# Patient Record
Sex: MALE | Race: WHITE | Employment: UNEMPLOYED | URBAN - METROPOLITAN AREA
[De-identification: names, ages, dates, MRNs, and addresses within clinical notes are randomized per-mention and may not be internally consistent; named-entity substitution may affect disease eponyms.]

---

## 2018-08-27 ENCOUNTER — HOSPITAL ENCOUNTER (EMERGENCY)
Age: 28
Discharge: LEFT BEFORE DISCHARGE | End: 2018-08-28
Attending: EMERGENCY MEDICINE
Payer: MEDICARE

## 2018-08-27 VITALS
DIASTOLIC BLOOD PRESSURE: 73 MMHG | RESPIRATION RATE: 14 BRPM | SYSTOLIC BLOOD PRESSURE: 125 MMHG | TEMPERATURE: 98.4 F | OXYGEN SATURATION: 100 % | HEART RATE: 102 BPM

## 2018-08-27 DIAGNOSIS — Z76.0 ENCOUNTER FOR MEDICATION REFILL: Primary | ICD-10-CM

## 2018-08-27 PROCEDURE — 99281 EMR DPT VST MAYX REQ PHY/QHP: CPT

## 2018-08-27 RX ORDER — OLANZAPINE 10 MG/1
10 TABLET ORAL ONCE
Status: DISCONTINUED | OUTPATIENT
Start: 2018-08-28 | End: 2018-08-28 | Stop reason: HOSPADM

## 2018-08-28 ASSESSMENT — ENCOUNTER SYMPTOMS
EYES NEGATIVE: 1
COUGH: 1
SHORTNESS OF BREATH: 0
ABDOMINAL PAIN: 0
VOMITING: 0
NAUSEA: 0
WHEEZING: 0

## 2018-08-28 NOTE — ED PROVIDER NOTES
Date of evaluation: 8/27/2018  Chief Complaint   Medication Refill    History of Present Illness   Navneet Oseguera is a 25 y.o. male who presents To the emergency department with his friend at the friend's request.  His friend reports that Jon Lisa did not take his bipolar medication today and he was concerned for him. Jon Lisa states he was recently diagnosed with bipolar and had his last pill yesterday. Endorses he has refills for his olanzapine at the pharmacy but that he has not picked it up yet. Reports he is still \"coming to terms\" with his diagnosis and that he has a lot of family and friend support. Denies any SI/HI/AVH. Currently does not feel he is having a manic high or depressive low. Spoke separately with the friend who agreed he did not think Jon Lisa was a danger to himself or others. He states he was concerned because he wasn't sure what would happen if Jon Lisa missed doses of his medication. Nursing Notes, Past Medical Hx, Past Surgical Hx, Social Hx, Allergies, and Family Hx were reviewed. Review of Systems   Review of Systems   HENT: Negative. Eyes: Negative. Respiratory: Positive for cough (active smoker). Negative for shortness of breath and wheezing. Cardiovascular: Negative for chest pain and palpitations. Gastrointestinal: Negative for abdominal pain, nausea and vomiting. Genitourinary: Negative. Musculoskeletal: Positive for joint swelling (right ankle s/p \"twisting it\" a week or so ago). Psychiatric/Behavioral: Negative for self-injury and suicidal ideas. All other systems reviewed and are negative. Past Medical, Surgical, Family, and Social History   No past medical history on file. No past surgical history on file. His family history includes Cancer in his maternal aunt. He reports that he has never smoked. He does not have any smokeless tobacco history on file. He reports that he drinks alcohol.  He reports that he uses drugs, including

## 2018-08-28 NOTE — ED PROVIDER NOTES
ED Attending Attestation Note     Date of evaluation: 8/27/2018    This patient was seen by the resident. I have seen and examined the patient, agree with the workup, evaluation, management and diagnosis. The care plan has been discussed. My assessment reveals Chris Dinero is awake, walking in the hallway when I saw him, he'll be given a dose of his Zyprexa and discharged home.      Mana López MD  08/28/18 5663

## 2018-08-28 NOTE — ED NOTES
Pt left before getting medication and dc instructions because his ride had to be at work at [facility] had not sent medication by the time patient left the ED     Leota Boxer, RN  08/28/18 0100

## 2022-10-28 ENCOUNTER — APPOINTMENT (OUTPATIENT)
Dept: GENERAL RADIOLOGY | Age: 32
End: 2022-10-28
Payer: MEDICAID

## 2022-10-28 ENCOUNTER — HOSPITAL ENCOUNTER (EMERGENCY)
Age: 32
Discharge: HOME | End: 2022-10-28
Attending: EMERGENCY MEDICINE
Payer: MEDICAID

## 2022-10-28 VITALS
HEART RATE: 101 BPM | OXYGEN SATURATION: 100 % | WEIGHT: 129 LBS | RESPIRATION RATE: 18 BRPM | SYSTOLIC BLOOD PRESSURE: 127 MMHG | DIASTOLIC BLOOD PRESSURE: 98 MMHG | TEMPERATURE: 97.6 F | BODY MASS INDEX: 19.11 KG/M2 | HEIGHT: 69 IN

## 2022-10-28 DIAGNOSIS — S80.01XA CONTUSION OF RIGHT KNEE, INITIAL ENCOUNTER: Primary | ICD-10-CM

## 2022-10-28 PROCEDURE — 73562 X-RAY EXAM OF KNEE 3: CPT

## 2022-10-28 PROCEDURE — 6370000000 HC RX 637 (ALT 250 FOR IP): Performed by: EMERGENCY MEDICINE

## 2022-10-28 PROCEDURE — 99284 EMERGENCY DEPT VISIT MOD MDM: CPT

## 2022-10-28 RX ORDER — NAPROXEN 500 MG/1
500 TABLET ORAL 2 TIMES DAILY WITH MEALS
Qty: 60 TABLET | Refills: 0 | Status: SHIPPED | OUTPATIENT
Start: 2022-10-28

## 2022-10-28 RX ORDER — IBUPROFEN 400 MG/1
800 TABLET ORAL ONCE
Status: COMPLETED | OUTPATIENT
Start: 2022-10-28 | End: 2022-10-28

## 2022-10-28 RX ADMIN — IBUPROFEN 800 MG: 400 TABLET, FILM COATED ORAL at 09:59

## 2022-10-28 ASSESSMENT — PAIN DESCRIPTION - ORIENTATION
ORIENTATION: RIGHT
ORIENTATION: RIGHT

## 2022-10-28 ASSESSMENT — PAIN - FUNCTIONAL ASSESSMENT
PAIN_FUNCTIONAL_ASSESSMENT: 0-10
PAIN_FUNCTIONAL_ASSESSMENT: NONE - DENIES PAIN
PAIN_FUNCTIONAL_ASSESSMENT: 0-10

## 2022-10-28 ASSESSMENT — PAIN DESCRIPTION - LOCATION
LOCATION: KNEE;SHOULDER
LOCATION: KNEE
LOCATION: KNEE

## 2022-10-28 ASSESSMENT — PAIN DESCRIPTION - PAIN TYPE: TYPE: ACUTE PAIN

## 2022-10-28 ASSESSMENT — PAIN DESCRIPTION - DESCRIPTORS
DESCRIPTORS: ACHING
DESCRIPTORS: ACHING

## 2022-10-28 ASSESSMENT — PAIN SCALES - GENERAL
PAINLEVEL_OUTOF10: 9
PAINLEVEL_OUTOF10: 7
PAINLEVEL_OUTOF10: 9

## 2022-10-28 NOTE — ED PROVIDER NOTES
[address] [facility]          ATTENDING PHYSICIAN NOTE       Date of evaluation: 10/28/2022    Chief Complaint     Fall and Knee Pain (R side, fell on Tuesday night )      History of Present Illness     Chavo Prajapati is a 27 y.o. male who presents with right knee pain. The patient states that he fell onto his right knee 3 days ago. He has subsequently had difficulty bearing weight on his right knee. Took 2 Tylenol the other day without much relief. Has not had anything else for pain. It hurts to straighten his knee and bear weight. There is some bruising over his knee. He also reports some discomfort in his left shoulder particularly with abduction although he does not feel that this warrants any x-ray imaging. No loss of consciousness. No other complaints. No other aggravating relieving factors or associated symptoms. Review of Systems     Positive for right knee pain. Negative for fever, vomiting, chest pain, shortness of breath, abdominal pain. All other systems were reviewed and are negative, except as mentioned in HPI. Past Medical, Surgical, Family, and Social History     He has no past medical history on file. He has no past surgical history on file. His family history includes Cancer in his maternal aunt. He reports that he has been smoking cigarettes. He has never used smokeless tobacco. He reports current alcohol use. He reports current drug use. Drug: Marijuana [place]). Medications     Previous Medications    No medications on file       Allergies     He is allergic to bee venom. Physical Exam     INITIAL VITALS: BP: (!) 127/98, Temp: 97.6 °F (36.4 °C), Heart Rate: 99, Resp: 19, SpO2: 95 %       General:  Well appearing. No acute distress. Eyes:  Pupils reactive. No discharge from eyes. ENT:  No discharge from nose. Neck:  Supple. Pulmonary:   Non-labored breathing. Breath sounds clear bilaterally.     Cardiac:  Regular rate and rhythm. No murmurs. Abdomen:  Soft. Non-tender. Non-distended. Musculoskeletal: Ecchymosis over right knee. Diffuse tenderness to palpation over right knee. The patient has full range of motion both passive and active although active range of motion is painful. Right leg is neurovascularly intact. The patient has full range of motion of the left shoulder without overlying bruising or abrasions. Left arm is neurovascularly intact. No focal tenderness to palpation. Skin:  No rash. Neuro:  Alert and oriented x4. Moves all four extremities to command. No focal deficit. Extremities:  Warm and perfused. No peripheral edema. Diagnostic Results     RADIOLOGY:  Please see electronic medical record for imaging studies performed in the ED. RECENT VITALS:  BP: (!) 127/98, Temp: 97.6 °F (36.4 °C), Heart Rate: (!) 101, Resp: 18     Procedures         ED Course     Nursing Notes, Past Medical Hx, Past Surgical Hx, Social Hx, Allergies, and Family Hx were reviewed. The patient was given the following medications:  Orders Placed This Encounter   Medications    ibuprofen (ADVIL;MOTRIN) tablet 800 mg    naproxen (NAPROSYN) 500 MG tablet     Sig: Take 1 tablet by mouth 2 times daily (with meals)     Dispense:  60 tablet     Refill:  0       CONSULTS:  None    MEDICAL DECISION MAKING / ASSESSMENT / PLAN     Ric Small is a 27 y.o. male presenting with right knee pain. He was given ibuprofen for pain. X-ray of the right knee was ordered. I offered x-ray of the left shoulder as well although the patient declined. I think this is reasonable as I have a low suspicion for bony injury. X-ray of right knee was negative for fracture or effusion. The patient was given an Ace wrap for comfort. He was provided with crutches to help with ambulation although advised that he needs to be weightbearing as tolerated and continue to try to bear weight on that knee.   He should keep it elevated and ice aggressively. Prescription for naproxen. Work excuse given to go back to work on Monday. Return precautions given. Clinical Impression     1.  Contusion of right knee, initial encounter        Disposition     PATIENT REFERRED TO:  Rizwana Fischer MD  [address]  [facility] [phone]    Call in 1 week  for follow up if not improved    [facility] Emergency Department  [address] [facility]  [phone]    If symptoms worsen    DISCHARGE MEDICATIONS:  New Prescriptions    NAPROXEN (NAPROSYN) 500 MG TABLET    Take 1 tablet by mouth 2 times daily (with meals)       DISPOSITION Decision To Discharge 10/28/2022 10:42:52 AM           Gustabo Keating MD  10/28/22 4586

## 2022-10-28 NOTE — Clinical Note
Harpal Or was seen and treated in our emergency department on 10/28/2022. He may return to work on 10/31/2022. If you have any questions or concerns, please don't hesitate to call.       Genaro Stock MD

## 2022-10-28 NOTE — DISCHARGE INSTRUCTIONS
Ace wrap for comfort. Elevate your leg when able. Ice for 20 to 30 minutes 2-3 times a day. Use crutches to help with ambulation but continue to bear weight with that knee is much as possible. Naproxen for pain and inflammation.

## 2023-02-24 ENCOUNTER — APPOINTMENT (OUTPATIENT)
Dept: GENERAL RADIOLOGY | Age: 33
End: 2023-02-24
Payer: MEDICAID

## 2023-02-24 ENCOUNTER — APPOINTMENT (OUTPATIENT)
Dept: CT IMAGING | Age: 33
End: 2023-02-24
Payer: MEDICAID

## 2023-02-24 ENCOUNTER — HOSPITAL ENCOUNTER (EMERGENCY)
Age: 33
Discharge: HOME | End: 2023-02-25
Attending: EMERGENCY MEDICINE
Payer: MEDICAID

## 2023-02-24 DIAGNOSIS — S82.142A CLOSED FRACTURE OF LEFT TIBIAL PLATEAU, INITIAL ENCOUNTER: ICD-10-CM

## 2023-02-24 DIAGNOSIS — S62.314A CLOSED DISPLACED FRACTURE OF BASE OF FOURTH METACARPAL BONE OF RIGHT HAND, INITIAL ENCOUNTER: ICD-10-CM

## 2023-02-24 DIAGNOSIS — S62.306A CLOSED DISPLACED FRACTURE OF FIFTH METACARPAL BONE OF RIGHT HAND, UNSPECIFIED PORTION OF METACARPAL, INITIAL ENCOUNTER: Primary | ICD-10-CM

## 2023-02-24 PROCEDURE — 73030 X-RAY EXAM OF SHOULDER: CPT

## 2023-02-24 PROCEDURE — 96372 THER/PROPH/DIAG INJ SC/IM: CPT

## 2023-02-24 PROCEDURE — 6370000000 HC RX 637 (ALT 250 FOR IP): Performed by: STUDENT IN AN ORGANIZED HEALTH CARE EDUCATION/TRAINING PROGRAM

## 2023-02-24 PROCEDURE — 73110 X-RAY EXAM OF WRIST: CPT

## 2023-02-24 PROCEDURE — 73130 X-RAY EXAM OF HAND: CPT

## 2023-02-24 PROCEDURE — 73610 X-RAY EXAM OF ANKLE: CPT

## 2023-02-24 PROCEDURE — 6370000000 HC RX 637 (ALT 250 FOR IP)

## 2023-02-24 PROCEDURE — 6360000002 HC RX W HCPCS

## 2023-02-24 PROCEDURE — 73590 X-RAY EXAM OF LOWER LEG: CPT

## 2023-02-24 PROCEDURE — 73700 CT LOWER EXTREMITY W/O DYE: CPT

## 2023-02-24 PROCEDURE — 99284 EMERGENCY DEPT VISIT MOD MDM: CPT

## 2023-02-24 PROCEDURE — 73552 X-RAY EXAM OF FEMUR 2/>: CPT

## 2023-02-24 PROCEDURE — 29125 APPL SHORT ARM SPLINT STATIC: CPT

## 2023-02-24 RX ORDER — ACETAMINOPHEN 500 MG
1000 TABLET ORAL ONCE
Status: COMPLETED | OUTPATIENT
Start: 2023-02-24 | End: 2023-02-24

## 2023-02-24 RX ORDER — ACETAMINOPHEN 500 MG
1000 TABLET ORAL
Status: COMPLETED | OUTPATIENT
Start: 2023-02-24 | End: 2023-02-24

## 2023-02-24 RX ORDER — IBUPROFEN 400 MG/1
800 TABLET ORAL ONCE
Status: COMPLETED | OUTPATIENT
Start: 2023-02-24 | End: 2023-02-24

## 2023-02-24 RX ORDER — OXYCODONE HYDROCHLORIDE 5 MG/1
5 TABLET ORAL ONCE
Status: COMPLETED | OUTPATIENT
Start: 2023-02-24 | End: 2023-02-24

## 2023-02-24 RX ORDER — KETOROLAC TROMETHAMINE 30 MG/ML
15 INJECTION, SOLUTION INTRAMUSCULAR; INTRAVENOUS ONCE
Status: COMPLETED | OUTPATIENT
Start: 2023-02-24 | End: 2023-02-24

## 2023-02-24 RX ORDER — METHOCARBAMOL 500 MG/1
750 TABLET, FILM COATED ORAL ONCE
Status: COMPLETED | OUTPATIENT
Start: 2023-02-24 | End: 2023-02-24

## 2023-02-24 RX ADMIN — METHOCARBAMOL 750 MG: 500 TABLET ORAL at 21:56

## 2023-02-24 RX ADMIN — IBUPROFEN 800 MG: 400 TABLET ORAL at 21:57

## 2023-02-24 RX ADMIN — OXYCODONE HYDROCHLORIDE 5 MG: 5 TABLET ORAL at 21:56

## 2023-02-24 RX ADMIN — OXYCODONE HYDROCHLORIDE 5 MG: 5 TABLET ORAL at 18:34

## 2023-02-24 RX ADMIN — ACETAMINOPHEN 1000 MG: 500 TABLET ORAL at 21:57

## 2023-02-24 RX ADMIN — KETOROLAC TROMETHAMINE 15 MG: 30 INJECTION, SOLUTION INTRAMUSCULAR; INTRAVENOUS at 15:47

## 2023-02-24 RX ADMIN — ACETAMINOPHEN 1000 MG: 500 TABLET ORAL at 15:47

## 2023-02-24 ASSESSMENT — LIFESTYLE VARIABLES
HOW OFTEN DO YOU HAVE A DRINK CONTAINING ALCOHOL: 2-3 TIMES A WEEK
HOW MANY STANDARD DRINKS CONTAINING ALCOHOL DO YOU HAVE ON A TYPICAL DAY: 1 OR 2

## 2023-02-24 ASSESSMENT — PAIN DESCRIPTION - LOCATION
LOCATION: HAND;KNEE

## 2023-02-24 ASSESSMENT — PAIN SCALES - GENERAL
PAINLEVEL_OUTOF10: 9
PAINLEVEL_OUTOF10: 9
PAINLEVEL_OUTOF10: 8
PAINLEVEL_OUTOF10: 9

## 2023-02-24 ASSESSMENT — PAIN DESCRIPTION - DESCRIPTORS: DESCRIPTORS: SHARP

## 2023-02-24 ASSESSMENT — PAIN - FUNCTIONAL ASSESSMENT: PAIN_FUNCTIONAL_ASSESSMENT: 0-10

## 2023-02-24 NOTE — PLAN OF CARE
[facility] Orthopedic Surgery  Plan of Care Note          Orthopedic Plan of Care Note     Joselito Chen 27 y.o. presented to ED for 3 days of worsening left knee pain and inability WB after fall at home from 3 steps     Imaging reviewed, and showed minimally displaced medial plateau fx to the left knee. But 100% displaced right hand 5th MC fx. Plan:  - Left knee immobilizer, non op treatment at this time  -ulnar gutter right hand. This will require ORIF by one of my partners, on Mon Feb 27th, [facility]     Thank you very much for the kind consultation and allowing me to participate in this patient's care. I will continue to keep you apprised of his progress.            Andrey Boss MD , MD 2/24/2023 5:51 PM

## 2023-02-24 NOTE — ED PROVIDER NOTES
[address] [facility] RESIDENT NOTE       Date of evaluation: 2/24/2023    Chief Complaint     Fall (Pt c/o right hand and left knee pain and swelling post fall. States he fell while taking the garbage out. Pt did not hit head, no LOC.)      History of Present Illness     Raul Chowdhury is a 27 y.o. male with no significant past medical history who presents after a fall on 2/21. Patient states that he was taking his trash out when he fell down approximately 4 stairs, twisting his left knee and landing on his right outstretched hand. He denies hitting his head or losing consciousness. He has not had significant nausea or vomiting since the incident. He denies any midline neck tenderness and has been able to ambulate since the accident. Initially, he attempted to treat his pain with conservative measurements, however today his pain increased to the level where ambulation became difficult and he was unable to complete tasks at work. At that time, his boss instructed him to present to the emergency department. Currently, he is complaining primarily of pain in his left knee as well as his right hand. He also has a mild amount of pain in his right shoulder. He denies any loss of sensation throughout his body. He denies any dizziness or confusion and continues to be without nausea, vomiting or syncope. Patient did not have any abrasions or lacerations from the injury. Patient does not take a blood thinner. MEDICAL DECISION MAKING / ASSESSMENT / PLAN     INITIAL VITALS: BP: 123/72, Temp: 97.7 °F (36.5 °C), Heart Rate: 98, Resp: 16, SpO2: 95 %    Raul Chowdhury is a 27 y.o. male who presents with left knee right hand and right shoulder pain. Plain radiographs of the left lower extremity reveal no obvious osseous abnormality or significant joint effusion.   However, the patient's pain is primarily along the inferior joint line of the knee, raising my concern for an occult tibial plateau fracture. Therefore, an Noncon CT scan was obtained which revealed a small, nondisplaced tibial plateau fracture. Orthopedic surgery was consulted and recommends immobilization in a knee immobilizer and follow-up with them in the outpatient setting on Monday, they are hopeful for nonoperative management but will reassess at that time. He will be nonweightbearing in his left lower extremity. For the pain in the patient's right hand, he was neurovascularly intact in all 5 digits. He attempted to make a fist with mild scissoring of his pinky finger. Plain radiographs revealed acute displaced fractures of the fourth and fifth metacarpal shafts. Orthopedic surgery was consulted on these injuries as well, and feel that the right fourth metacarpal will likely need a pin as it is an angulated fracture, but feels that the fifth metacarpal can be managed nonoperatively. The patient will be placed in a ulnar gutter splint and made nonweightbearing in his right upper extremity. On my secondary survey, the patient was without any other injuries Stearn for acute head injury as the patient is not complaining of a headache, without any loss of consciousness and nausea and vomiting. Ultimately, the patient was offered admission for PT/OT evaluation as well as pain control given that he has nonweightbearing for the side of his body, at this time I am going off service, and his care will be transitioned to Dr. Kyung Plummer, whose tasks will include: Follow up splint placement  Dispo -- Obs for pain control and PT/OT vs home  Pain control    Spoke with Dr. Chacho Diaz who requests the patient presents to [facility] for evaluation for operative management of his right hand and re-eval of the left leg. If they do not call the patient, he should present at [address] [place] [address] at 0900 on 2/27 for evaluation.     Medical Decision Making  Problems Addressed:  Closed displaced fracture of base of fourth metacarpal bone of right hand, initial encounter: acute illness or injury  Closed displaced fracture of fifth metacarpal bone of right hand, unspecified portion of metacarpal, initial encounter: acute illness or injury  Closed fracture of left tibial plateau, initial encounter: acute illness or injury    Amount and/or Complexity of Data Reviewed  Radiology: ordered. Decision-making details documented in ED Course. Risk  OTC drugs. Prescription drug management. Minor surgery with no identified risk factors. This patient was also evaluated by the attending physician. All care plans werediscussed and agreed upon. Clinical Impression     1. Closed displaced fracture of fifth metacarpal bone of right hand, unspecified portion of metacarpal, initial encounter    2. Closed displaced fracture of base of fourth metacarpal bone of right hand, initial encounter    3. Closed fracture of left tibial plateau, initial encounter        Disposition     PATIENT REFERRED TO:  No follow-up provider specified. DISCHARGE MEDICATIONS:  New Prescriptions    No medications on file       DISPOSITION          Diagnostic Results and Other Data     RADIOLOGY:  CT KNEE LEFT WO CONTRAST   Final Result   Impression: Nondisplaced fracture of the medial tibial plateau with involvement of the articular surface. XR WRIST RIGHT (MIN 3 VIEWS)   Final Result   1. Negative radiographs of the left ankle. 2. Acute, displaced negative fractures of the fourth and fifth metatarsal mid shafts in the right hand. No other fractures in the hand or wrist.   3. Negative radiographs of the left shoulder. 4. Negative radiographs of the left tibia/fibula. 5. Negative radiographs of the left femur. XR HAND RIGHT (MIN 3 VIEWS)   Final Result   1. Negative radiographs of the left ankle. 2. Acute, displaced negative fractures of the fourth and fifth metatarsal mid shafts in the right hand.  No other fractures in the hand or wrist.   3. Negative radiographs of the left shoulder. 4. Negative radiographs of the left tibia/fibula. 5. Negative radiographs of the left femur. XR SHOULDER LEFT (MIN 2 VIEWS)   Final Result   1. Negative radiographs of the left ankle. 2. Acute, displaced negative fractures of the fourth and fifth metatarsal mid shafts in the right hand. No other fractures in the hand or wrist.   3. Negative radiographs of the left shoulder. 4. Negative radiographs of the left tibia/fibula. 5. Negative radiographs of the left femur. XR ANKLE LEFT (MIN 3 VIEWS)   Final Result   1. Negative radiographs of the left ankle. 2. Acute, displaced negative fractures of the fourth and fifth metatarsal mid shafts in the right hand. No other fractures in the hand or wrist.   3. Negative radiographs of the left shoulder. 4. Negative radiographs of the left tibia/fibula. 5. Negative radiographs of the left femur. XR TIBIA FIBULA LEFT (2 VIEWS)   Final Result   1. Negative radiographs of the left ankle. 2. Acute, displaced negative fractures of the fourth and fifth metatarsal mid shafts in the right hand. No other fractures in the hand or wrist.   3. Negative radiographs of the left shoulder. 4. Negative radiographs of the left tibia/fibula. 5. Negative radiographs of the left femur. XR FEMUR LEFT (MIN 2 VIEWS)   Final Result   1. Negative radiographs of the left ankle. 2. Acute, displaced negative fractures of the fourth and fifth metatarsal mid shafts in the right hand. No other fractures in the hand or wrist.   3. Negative radiographs of the left shoulder. 4. Negative radiographs of the left tibia/fibula. 5. Negative radiographs of the left femur.                   RECENT VITALS:  BP: 125/88, Temp: 97.7 °F (36.5 °C), Heart Rate: 98,Resp: 16, SpO2: 99 %       ED Course     Nursing Notes, Past Medical Hx, Past Surgical Hx, Social Hx, Allergies, and Family Hx were reviewed. The patient was given the following medications:  Orders Placed This Encounter   Medications    ketorolac (TORADOL) injection 15 mg    acetaminophen (TYLENOL) tablet 1,000 mg    oxyCODONE (ROXICODONE) immediate release tablet 5 mg       CONSULTS:  IP CONSULT TO ORTHOPEDIC SURGERY    Review of Systems   Review of systems otherwise negative. Past Medical, Surgical, Family, and Social History     He has no past medical history on file. He has no past surgical history on file. His family history includes Cancer in his maternal aunt. He reports that he quit smoking 7 days ago. His smoking use included cigarettes. He has never used smokeless tobacco. He reports current alcohol use. He reports current drug use. Drug: Marijuana [place]). Medications     Previous Medications    NAPROXEN (NAPROSYN) 500 MG TABLET    Take 1 tablet by mouth 2 times daily (with meals)       Allergies     He is allergic to bee venom. Physical Exam     INITIAL VITALS: BP: 123/72, Temp: 97.7 °F (36.5 °C), Heart Rate: 98, Resp: 16, SpO2: 95 %   Physical Exam  Vitals reviewed. Constitutional:       General: He is not in acute distress. Appearance: Normal appearance. HENT:      Head: Normocephalic and atraumatic. Eyes:      Extraocular Movements: Extraocular movements intact. Pupils: Pupils are equal, round, and reactive to light. Cardiovascular:      Rate and Rhythm: Normal rate and regular rhythm. Pulses: Normal pulses. Heart sounds: Normal heart sounds. Pulmonary:      Effort: Pulmonary effort is normal.   Abdominal:      General: Abdomen is flat. Musculoskeletal:      Right hand: Swelling, deformity and tenderness present. Normal range of motion. Cervical back: Normal range of motion. No rigidity. Right knee: Swelling, deformity and ecchymosis present. Tenderness present. No ACL laxity or PCL laxity. Left knee: Ecchymosis present. No swelling or effusion.  No ACL laxity or PCL laxity. Skin:     General: Skin is warm and dry. Neurological:      General: No focal deficit present. Mental Status: He is alert. Sensory: No sensory deficit. Motor: No weakness.               Latasha Gastelum MD  Resident  02/24/23 4149

## 2023-02-24 NOTE — ED PROVIDER NOTES
ED Attending Attestation Note     Date of evaluation: 2/24/2023    This patient was seen by the resident. I have seen and examined the patient, agree with the workup, evaluation, management and diagnosis. The care plan has been discussed. My assessment reveals a very slender, generally well-appearing gentleman, in obvious discomfort, but in no acute distress. The patient presents to the emergency department with ongoing pain and swelling of primarily his right hand and left knee after a fall down several stairs 3 days ago. On examination, the patient has obvious diffuse edema of the right hand, with pain primarily over the metacarpals, and some ecchymosis over the palmar surface of the hand. Plain films do show fractures of the fourth and fifth metacarpals. Additionally, the patient has an obvious significant left knee effusion, with significant tenderness to palpation and pain on range of motion, and pain that is much worse with weightbearing. On weightbearing and palpation, his pain appears to be most prominent over the proximal tibia. Plain films do not show any obvious bony abnormality, but given the severity of the effusion and the location of his maximal tenderness, a CT is being performed of the knee to evaluate for an occult tibial plateau fracture.        Josiane Cabrera MD  02/24/23 0819

## 2023-02-25 VITALS
DIASTOLIC BLOOD PRESSURE: 86 MMHG | HEART RATE: 73 BPM | HEIGHT: 69 IN | WEIGHT: 123.1 LBS | OXYGEN SATURATION: 99 % | SYSTOLIC BLOOD PRESSURE: 120 MMHG | RESPIRATION RATE: 18 BRPM | TEMPERATURE: 98.5 F | BODY MASS INDEX: 18.23 KG/M2

## 2023-02-25 PROCEDURE — 97530 THERAPEUTIC ACTIVITIES: CPT

## 2023-02-25 PROCEDURE — 97166 OT EVAL MOD COMPLEX 45 MIN: CPT

## 2023-02-25 PROCEDURE — 97162 PT EVAL MOD COMPLEX 30 MIN: CPT

## 2023-02-25 PROCEDURE — 97116 GAIT TRAINING THERAPY: CPT

## 2023-02-25 RX ORDER — OXYCODONE HYDROCHLORIDE 5 MG/1
5 TABLET ORAL EVERY 6 HOURS PRN
Qty: 14 TABLET | Refills: 0 | Status: SHIPPED | OUTPATIENT
Start: 2023-02-25 | End: 2023-02-26

## 2023-02-25 ASSESSMENT — PAIN - FUNCTIONAL ASSESSMENT
PAIN_FUNCTIONAL_ASSESSMENT: 0-10
PAIN_FUNCTIONAL_ASSESSMENT: PREVENTS OR INTERFERES SOME ACTIVE ACTIVITIES AND ADLS

## 2023-02-25 ASSESSMENT — PAIN DESCRIPTION - ONSET: ONSET: GRADUAL

## 2023-02-25 ASSESSMENT — PAIN DESCRIPTION - LOCATION: LOCATION: HAND

## 2023-02-25 ASSESSMENT — PAIN DESCRIPTION - PAIN TYPE: TYPE: ACUTE PAIN

## 2023-02-25 ASSESSMENT — PAIN SCALES - GENERAL: PAINLEVEL_OUTOF10: 4

## 2023-02-25 ASSESSMENT — PAIN DESCRIPTION - DESCRIPTORS: DESCRIPTORS: DISCOMFORT

## 2023-02-25 ASSESSMENT — PAIN DESCRIPTION - ORIENTATION: ORIENTATION: RIGHT

## 2023-02-25 ASSESSMENT — PAIN DESCRIPTION - FREQUENCY: FREQUENCY: CONTINUOUS

## 2023-02-25 NOTE — PROGRESS NOTES
Occupational Therapy  Facility/Department: [address] [place] [address]  Occupational Therapy Initial Assessment, Tx, and Discharge    Name: Ric Small  : 1990  MRN: [id number]  Date of Service: 2023    Discharge Recommendations: Ric Small scored a 21/24 on the AM-[facility] ADL Inpatient form. At this time, no further OT is recommended upon discharge. Recommend patient returns to prior setting with prior services. 24 hour supervision or assist  OT Equipment Recommendations  Other: Pt has needed DME       Patient Diagnosis(es): The primary encounter diagnosis was Closed displaced fracture of fifth metacarpal bone of right hand, unspecified portion of metacarpal, initial encounter. Diagnoses of Closed displaced fracture of base of fourth metacarpal bone of right hand, initial encounter and Closed fracture of left tibial plateau, initial encounter were also pertinent to this visit. Past Medical History:  has no past medical history on file. Past Surgical History:  has no past surgical history on file. Assessment   Performance deficits / Impairments: Decreased functional mobility ; Decreased ADL status; Decreased high-level IADLs;Decreased ROM  Assessment: Pt is 27 y.o male who presents from home after a fall resulting in L tibial plateau fx and R 4NG/0AS MC fx. Pt reports PTA he was highly independent with all functional activity. Pt reports he has been utilizing wc for mobility at home and to complete ADL/IADL activity. Pt demonstrates functioning close to baseline and completes functional mobility, functional transfers and ADLs mod I. Pt reports no concerns for returning home. No acute OT needs identified, will dc services. Please re-consult should a need arise. Recommend initial 24 hr supervision for safety.    Prognosis: Good  Decision Making: Medium Complexity  REQUIRES OT FOLLOW-UP: No  Activity Tolerance  Activity Tolerance: Patient Tolerated treatment well Plan   Occupational Therapy Plan  Times Per Week: 1x visit only     Restrictions  Position Activity Restriction  Other position/activity restrictions: Knee immobilizer, NWB LLE, NWB R hand    Subjective   General  Chart Reviewed: Yes  Patient assessed for rehabilitation services?: Yes  Additional Pertinent Hx: Admit 2/24 after a fall. Xray femur (negative); Xray L tibia/fibula (negative); Xray L ankle (negative); Xray R hand - Acute, displaced negative fractures of the fourth and fifth metatarsal mid shafts in the right hand; Xray L shoulder - negative; CT knee - nondisplaced fx of medial tibial plateau with involvement of articular surface  Family / Caregiver Present: No  Referring Practitioner: Tiffany Hanna MD  Diagnosis: Fall  Subjective  Subjective: Pt found supine on stretcher. Pt agreeable to OT eval and tx. Pt does not rate pain in RUE/LLE, RN aware  Social/Functional History  Social/Functional History  Lives With:  (brother)  Type of Home: Apartment (2nd floor)  Home Layout: One level  Home Access: Stairs to enter with rails (flight of steps to 2nd floor apartment)  Bathroom Shower/Tub: Tub/Shower unit  Bathroom Toilet: Standard  Bathroom Equipment: None  Bathroom Accessibility: Wheelchair accessible  Home Equipment: Mcgraw Ora  Has the patient had two or more falls in the past year or any fall with injury in the past year?: No  ADL Assistance: Independent  Homemaking Assistance: Independent  Ambulation Assistance: Independent  Transfer Assistance: Independent  Active : No  Mode of Transportation: Walk  Occupation: Full time employment (works at [employer])       Objective Treatment included functional transfer training, ADL's and pt. education. Safety Devices  Type of Devices: Left in bed;Nurse notified;Call light within reach (on stretcher in ED  Simultaneous filing.  User may not have seen previous data.)  Bed Mobility Training  Bed Mobility Training: Yes  Overall Level of Assistance: Modified independent  Supine to Sit: Modified independent  Sit to Supine: Modified independent  Balance  Sitting: Intact  Standing: With support  Transfer Training  Transfer Training: Yes  Overall Level of Assistance: Modified independent  Interventions: Verbal cues; Safety awareness training  Sit to Stand: Modified independent (stand pivot to chair and stand up to platform walker)  Stand to Sit: Modified independent  Stand Pivot Transfers: Modified independent  Bed to Chair: Modified independent (simulated WC transfer)  Gait  Overall Level of Assistance: Modified independent (with platform walker)  Interventions: Verbal cues; Safety awareness training     AROM: Within functional limits (R Hand/wrist not assessed 2/2 ulnar gutter in place, remainder of B UE WFL)  PROM: Within functional limits  Strength: Within functional limits  Coordination: Within functional limits  Tone: Normal  Sensation: Intact  ADL  LE Dressing: Modified independent   Toileting: Modified independent               Vision  Vision: Within Functional Limits  Hearing  Hearing: Within functional limits  Cognition  Overall Cognitive Status: WNL  Orientation  Overall Orientation Status: Within Normal Limits (Simultaneous filing. User may not have seen previous data.)                  Education Given To: Patient  Education Provided: Role of Therapy; ADL Adaptive Strategies; Fall Prevention Strategies; Plan of Care;Transfer Training  Education Method: Verbal  Barriers to Learning: None  Education Outcome: Verbalized understanding                      AM-PAC Score  AM-PAC Inpatient Daily Activity Raw Score: 21 (02/25/23 0847)  AM-PAC Inpatient ADL T-Scale Score : 44.27 (02/25/23 0847)  ADL Inpatient CMS 0-100% Score: 32.79 (02/25/23 0847)  ADL Inpatient CMS G-Code Modifier : CJ (02/25/23 9337)      Therapy Time   Individual Concurrent Group Co-treatment   Time In 0805         Time Out 0830         Minutes 25             Timed Code Treatment Minutes:   15 mins + 10 min eval    Total Treatment Minutes:  25 mins      Jorden Moss, OTR/L

## 2023-02-25 NOTE — ED PROVIDER NOTES
[facility] Emergency Department  ED Observation Disposition Note   Emergency Physicians         Diagnostic Evaluation      Diagnostic studies germane to this period of clinical observation include:    PT OT eval     Consultant(s) Final Recommendations      - Home with family help, follow up with ortho     Impression and Plan      Gerardo White has been cared for according to the standard [facility] observation protocol for PT-OT eval. This extended period of observation was specifically required to determine the need for hospitalization. Prior to discharge from observation, the final physical exam is documented below. Significant events during the course of observation based on the goals of the clinical problem list include:      -Pain controlled overnight, PT OT evaluation this morning found to have sufficient resources at home    Based on the patient's condition, clinical response, and diagnostic information obtained during this period of observation, the plan is to Discharge to home    The total length of observation was 12 hours. Dr. Sims Dubin is the observation disposition attending. The patient will follow-up with:    - Dr Cuate Tinajero    As appropriate, please see the AVS for comprehensive discharge instructions. Deana White has undergone comprehensive diagnostic evaluation and therapeutic management in accordance with the CDU guidelines for PT OT eval.  At the time of disposition, the patient was evaluated by PTOT, appears to have appropriate resources at home, able to follow-up, and appropriate for discharge. We will prescribe some oxycodone for use in addition to Tylenol and ibuprofen, and provided orthopedic follow-up information. Placido Gonzalez Physical Examination      Physical Exam  Constitutional:       Appearance: Normal appearance. Pulmonary:      Effort: Pulmonary effort is normal. No respiratory distress.    Musculoskeletal:      Cervical back: Normal range of motion. Comments: Left knee in extension brace   Neurological:      General: No focal deficit present. Mental Status: He is alert and oriented to person, place, and time.            Nkechi Kunz MD  02/25/23 6937

## 2023-02-25 NOTE — ED PROVIDER NOTES
[facility] RESIDENT NOTE     Date of evaluation: 2/24/2023    ADDENDUM:      Care of this patient was assumed from Dr. Leif Jaime. The patient was seen for Fall (Pt c/o right hand and left knee pain and swelling post fall. States he fell while taking the garbage out. Pt did not hit head, no LOC.)  . The patient's initial evaluation and plan have been discussed with the prior provider who initially evaluated the patient. Nursing Notes, Past Medical Hx, Past Surgical Hx, Social Hx, Allergies, and Family Hx were all reviewed. Diagnostic Results     EKG   None. RADIOLOGY:  CT KNEE LEFT WO CONTRAST   Final Result   Impression: Nondisplaced fracture of the medial tibial plateau with involvement of the articular surface. XR WRIST RIGHT (MIN 3 VIEWS)   Final Result   1. Negative radiographs of the left ankle. 2. Acute, displaced negative fractures of the fourth and fifth metatarsal mid shafts in the right hand. No other fractures in the hand or wrist.   3. Negative radiographs of the left shoulder. 4. Negative radiographs of the left tibia/fibula. 5. Negative radiographs of the left femur. XR HAND RIGHT (MIN 3 VIEWS)   Final Result   1. Negative radiographs of the left ankle. 2. Acute, displaced negative fractures of the fourth and fifth metatarsal mid shafts in the right hand. No other fractures in the hand or wrist.   3. Negative radiographs of the left shoulder. 4. Negative radiographs of the left tibia/fibula. 5. Negative radiographs of the left femur. XR SHOULDER LEFT (MIN 2 VIEWS)   Final Result   1. Negative radiographs of the left ankle. 2. Acute, displaced negative fractures of the fourth and fifth metatarsal mid shafts in the right hand. No other fractures in the hand or wrist.   3. Negative radiographs of the left shoulder. 4. Negative radiographs of the left tibia/fibula.    5. Negative radiographs of the left femur.            XR ANKLE LEFT (MIN 3 VIEWS)   Final Result   1. Negative radiographs of the left ankle. 2. Acute, displaced negative fractures of the fourth and fifth metatarsal mid shafts in the right hand. No other fractures in the hand or wrist.   3. Negative radiographs of the left shoulder. 4. Negative radiographs of the left tibia/fibula. 5. Negative radiographs of the left femur. XR TIBIA FIBULA LEFT (2 VIEWS)   Final Result   1. Negative radiographs of the left ankle. 2. Acute, displaced negative fractures of the fourth and fifth metatarsal mid shafts in the right hand. No other fractures in the hand or wrist.   3. Negative radiographs of the left shoulder. 4. Negative radiographs of the left tibia/fibula. 5. Negative radiographs of the left femur. XR FEMUR LEFT (MIN 2 VIEWS)   Final Result   1. Negative radiographs of the left ankle. 2. Acute, displaced negative fractures of the fourth and fifth metatarsal mid shafts in the right hand. No other fractures in the hand or wrist.   3. Negative radiographs of the left shoulder. 4. Negative radiographs of the left tibia/fibula. 5. Negative radiographs of the left femur. LABS:   No results found for this visit on 23.     RECENT VITALS:  BP: 125/88, Temp: 97.7 °F (36.5 °C), Heart Rate: 98, Resp: 16, SpO2: 99 %     Procedures     None    ED Course            The patient was given the following medications:  Orders Placed This Encounter   Medications    ketorolac (TORADOL) injection 15 mg    acetaminophen (TYLENOL) tablet 1,000 mg    oxyCODONE (ROXICODONE) immediate release tablet 5 mg    oxyCODONE (ROXICODONE) immediate release tablet 5 mg    acetaminophen (TYLENOL) tablet 1,000 mg    ibuprofen (ADVIL;MOTRIN) tablet 800 mg    methocarbamol (ROBAXIN) tablet 750 mg       CONSULTS:  CHERRI Ignacio / ADEN / Harpal [place] is a 27 y.o. male who presents after a fall on Tuesday. Has a left tibial plateau fx  - ortho recs knee immobilizer, F/U Monday, NWB LLE. Additionally has a R 4th and 5th Metacarpal fx - angulated, will likely require surgery, potentially Monday. Splint placement and pain control pending at time of signout. Patient deciding on ED obs for PT/OT vs D/C. Patient is reassessed after splint placement. Continues to complain of pain for which oxycodone was ordered. Discussion is had at length regarding disposition, patient will opt for ED observation for PT OT in the morning and continuing pain control. Multimodal analgesia is ordered for overnight. PT OT consultation is placed. Patient is placed in ED observation. This patient was also evaluated by the attending physician. All care plans were discussed and agreed upon. Clinical Impression     1. Closed displaced fracture of fifth metacarpal bone of right hand, unspecified portion of metacarpal, initial encounter    2. Closed displaced fracture of base of fourth metacarpal bone of right hand, initial encounter    3. Closed fracture of left tibial plateau, initial encounter        Disposition     PATIENT REFERRED TO:  No follow-up provider specified.     DISCHARGE MEDICATIONS:  New Prescriptions    No medications on file       DISPOSITION Ed Observation 02/24/2023 09:20:57 PM         Maida Hilton MD  Resident  02/25/23 5309

## 2023-02-25 NOTE — PROGRESS NOTES
Physical Therapy  Facility/Department: [facility] EMERGENCY DEPARTMENT  Physical Therapy Initial Assessment and DISCHARGE    Name: Veronica Decker  : 1990  MRN: [id number]  Date of Service: 2023    Discharge Recommendations:  Veronica Decker scored a 21/24 on the AM-PAC short mobility form. At this time, no further PT is recommended upon discharge. Recommend patient returns to prior setting with prior services. PT Equipment Recommendations  Equipment Needed: No      Assessment   Assessment: Pt from home s/p fall, resulting in L tibial plateau fx and R 5th metacarpal fx. Pt demonstrate independent stand pivot transfer techniques. Pt trialed use of platform walker with SBA, but prefers to return home at w/c level. Pt has a w/c at home. Pt plans to negotiate stairs on his buttocks and has a friend available to assist.  Pt has already been using this technique on the steps since his fall. No further PT or DME needs identified. Galindo sign off from acute PT services. Decision Making: Medium Complexity  Requires PT Follow-Up: No     Plan   General Plan: Discharge with evaluation only    Safety Devices  Type of Devices: Left in bed, Nurse notified, Call light within reach (on stretcher in ED  Simultaneous filing. User may not have seen previous data.)     Restrictions  Knee immobilizer, NWB LLE, NWB R hand     Subjective   Pain: Pt does not rate pain in RUE/LLE, RN aware    General  Chart Reviewed: Yes  Additional Pertinent Hx: Pt to ED  with L knee pain s/p fall down steps 3 days prior. [place] (+) for minimally displaced medial plateau fx to the left knee and 100% displaced right hand 5th MC fx. Ortho consult:  Non-op management L knee with immobilizer, plan for R hand ORIF on  at [facility].  PMH:  None  Diagnosis: Multiple fxs    Subjective  Subjective: Pt found supine in bed. Pt agreeable for PT. \"I've kind of just been figuring it out. \"    Social/Functional History  Lives With:  (brother)  Type of Home: Apartment (2nd floor)  Home Layout: One level  Home Access: Stairs to enter with rails (flight of steps to 2nd floor apartment)  Bathroom Shower/Tub: Tub/Shower unit  Bathroom Toilet: Standard  Bathroom Equipment: None  Bathroom Accessibility: Wheelchair accessible  Home Equipment: Edilberto Monaco  Has the patient had two or more falls in the past year or any fall with injury in the past year?: No  ADL Assistance: Independent  Homemaking Assistance: Independent  Ambulation Assistance: Independent  Transfer Assistance: Independent  Active : No  Mode of Transportation: Walk  Occupation: Full time employment (works at [employer])    Vision/Hearing  Vision: Within Functional Limits  Hearing: Within functional limits        Objective   Gross Assessment  AROM: Within functional limits  Strength: Within functional limits (L knee in immobilizer)     Bed mobility  Supine to Sit: Independent  Sit to Supine: Independent    Transfers  Sit to Stand: Modified independent  Stand to Sit: Modified independent  Stand Pivot Transfers: Modified independent    Ambulation  Device: Platform Walker right  Other Apparatus: Left;Knee Immobilizer  Assistance: Stand by assistance  Quality of Gait: hopping on R LE, steady gait  Distance: 15ft    Stairs?: No (Pt plans to scoot up steps on behind and has assist from friend available.   Pt has already been using this technique since fall.)    Balance  Sitting - Static: Good  Sitting - Dynamic: Good  Standing - Static: Good  Standing - Dynamic: Good      AM-PAC Score  AM-PAC Inpatient Mobility Raw Score : 21 (02/25/23 0837)  AM-PAC Inpatient T-Scale Score : 50.25 (02/25/23 0837)  Mobility Inpatient CMS 0-100% Score: 28.97 (02/25/23 0837)  Mobility Inpatient CMS G-Code Modifier : Natalia Rodriguez (02/25/23 8865)    Education  Patient Education  Education Given To: Patient  Education Provided: Role of Therapy;Precautions;Transfer Training  Education Provided Comments: knee immobilizer and WB restrictions  Education Method: Verbal  Education Outcome: Verbalized understanding;Demonstrated understanding      Therapy Time   Individual Concurrent Group Co-treatment   Time In 0805         Time Out 0830         Minutes 25         Timed Code Treatment Minutes:  15  Total Treatment Minutes:  [address], PT

## 2023-02-25 NOTE — ED PROVIDER NOTES
[facility] Emergency Department  EDObservation Admission Note   Emergency Physicians       Time Placed in ED Observation: DISPOSITION Ed Observation 2023 09:20:57 PM    Impression and Plan      In summary, Emely Holley is admitted by to the [facility] Observation Unit for PT/OT evaluation and pain control. Dr. Aliya Doherty is the observation admission attending. This patient has been risk-stratified based on the available history, physical exam, and related study findings. Admission to observation status for further diagnosis/treatment/monitoring of tibial plateau and hand fractures is warranted clinically. This extended period of observation is specifically required to determine the need for hospitalization. We will observe the patient for the following endpoints:    - PT/OT clearance  - Clinical stability of pain on oral medications    When met, appropriate disposition will be arranged. Diagnostic Evaluation      Diagnostic studies and ED interventions germane to this period of clinical observation will include:    PT/OT consultation       Consultant(s)      IP CONSULT TO ORTHOPEDIC SURGERY       Patient History      Emely Holley is a 27 y.o. male who presented to the Emergency Department for evaluation of knee pain. The acute evaluation included CT evidence of tibial plateau fx and XR evidence of metacarpal fractures. Upon admission to the Observation Unit, Emely Holley was awake, alert, conversant. He had pain in the Left knee. Past Medical History  History reviewed. No pertinent past medical history. History reviewed. No pertinent surgical history. Family History   Problem Relation Age of Onset    Cancer Maternal Aunt          of skin cancer     Social History     Tobacco Use    Smoking status: Former     Types: Cigarettes     Quit date: 2023     Years since quittin.0    Smokeless tobacco: Never   Substance Use Topics    Alcohol use:  Yes Comment: social    Drug use: Yes     Types: Marijuana (Weed)        Medications      Previous Medications    NAPROXEN (NAPROSYN) 500 MG TABLET    Take 1 tablet by mouth 2 times daily (with meals)          Review of Systems      No vomiting     Physical Examination      Physical Exam  Awake, alert, conversant  Right hand: moves all fingers, cap refill intact, sensation intact  Left lower extremity: compartments soft, cap refill intact, sensation intact, moves all digits on foot and at ankle plantar/dorsiflexion     Hilary Bermudez MD  02/24/23 4793

## 2023-02-25 NOTE — DISCHARGE INSTRUCTIONS
Return to ED for worsening symptoms or other concerns. Follow-up with orthopedics on Monday. You can start with 650-1000 mg Tylenol and ibuprofen for pain, and if pain is severe, you can take one of the oxycodone but would recommend the oxycodone taking sparingly, only when needed.

## 2023-02-27 ENCOUNTER — TELEPHONE (OUTPATIENT)
Dept: ORTHOPEDIC SURGERY | Age: 33
End: 2023-02-27

## 2023-02-27 ENCOUNTER — ANESTHESIA EVENT (OUTPATIENT)
Dept: OPERATING ROOM | Age: 33
End: 2023-02-27
Payer: MEDICAID

## 2023-02-27 ENCOUNTER — HOSPITAL ENCOUNTER (OUTPATIENT)
Age: 33
Setting detail: OUTPATIENT SURGERY
Discharge: HOME | End: 2023-02-27
Attending: ORTHOPAEDIC SURGERY | Admitting: ORTHOPAEDIC SURGERY
Payer: MEDICAID

## 2023-02-27 ENCOUNTER — ANESTHESIA (OUTPATIENT)
Dept: OPERATING ROOM | Age: 33
End: 2023-02-27
Payer: MEDICAID

## 2023-02-27 ENCOUNTER — APPOINTMENT (OUTPATIENT)
Dept: GENERAL RADIOLOGY | Age: 33
End: 2023-02-27
Attending: ORTHOPAEDIC SURGERY
Payer: MEDICAID

## 2023-02-27 VITALS
DIASTOLIC BLOOD PRESSURE: 74 MMHG | TEMPERATURE: 97 F | HEIGHT: 69 IN | OXYGEN SATURATION: 99 % | RESPIRATION RATE: 18 BRPM | HEART RATE: 84 BPM | WEIGHT: 125 LBS | SYSTOLIC BLOOD PRESSURE: 119 MMHG | BODY MASS INDEX: 18.51 KG/M2

## 2023-02-27 DIAGNOSIS — S62.326A CLOSED DISPLACED FRACTURE OF SHAFT OF FIFTH METACARPAL BONE OF RIGHT HAND, INITIAL ENCOUNTER: Primary | ICD-10-CM

## 2023-02-27 PROBLEM — S82.142A CLOSED FRACTURE OF LEFT TIBIAL PLATEAU: Status: ACTIVE | Noted: 2023-02-27

## 2023-02-27 PROBLEM — S62.314A CLOSED DISPLACED FRACTURE OF BASE OF FOURTH METACARPAL BONE OF RIGHT HAND: Status: ACTIVE | Noted: 2023-02-27

## 2023-02-27 PROCEDURE — 3209999900 FLUORO FOR SURGICAL PROCEDURES

## 2023-02-27 PROCEDURE — 7100000000 HC PACU RECOVERY - FIRST 15 MIN: Performed by: ORTHOPAEDIC SURGERY

## 2023-02-27 PROCEDURE — 7100000011 HC PHASE II RECOVERY - ADDTL 15 MIN: Performed by: ORTHOPAEDIC SURGERY

## 2023-02-27 PROCEDURE — 6360000002 HC RX W HCPCS: Performed by: ANESTHESIOLOGY

## 2023-02-27 PROCEDURE — 6360000002 HC RX W HCPCS: Performed by: ORTHOPAEDIC SURGERY

## 2023-02-27 PROCEDURE — 6360000002 HC RX W HCPCS

## 2023-02-27 PROCEDURE — 2580000003 HC RX 258: Performed by: ORTHOPAEDIC SURGERY

## 2023-02-27 PROCEDURE — 2500000003 HC RX 250 WO HCPCS

## 2023-02-27 PROCEDURE — 27530 TREAT KNEE FRACTURE: CPT | Performed by: ORTHOPAEDIC SURGERY

## 2023-02-27 PROCEDURE — 2709999900 HC NON-CHARGEABLE SUPPLY: Performed by: ORTHOPAEDIC SURGERY

## 2023-02-27 PROCEDURE — 3600000014 HC SURGERY LEVEL 4 ADDTL 15MIN: Performed by: ORTHOPAEDIC SURGERY

## 2023-02-27 PROCEDURE — 3700000000 HC ANESTHESIA ATTENDED CARE: Performed by: ORTHOPAEDIC SURGERY

## 2023-02-27 PROCEDURE — C1713 ANCHOR/SCREW BN/BN,TIS/BN: HCPCS | Performed by: ORTHOPAEDIC SURGERY

## 2023-02-27 PROCEDURE — 3600000004 HC SURGERY LEVEL 4 BASE: Performed by: ORTHOPAEDIC SURGERY

## 2023-02-27 PROCEDURE — 7100000010 HC PHASE II RECOVERY - FIRST 15 MIN: Performed by: ORTHOPAEDIC SURGERY

## 2023-02-27 PROCEDURE — 6370000000 HC RX 637 (ALT 250 FOR IP): Performed by: ANESTHESIOLOGY

## 2023-02-27 PROCEDURE — 99222 1ST HOSP IP/OBS MODERATE 55: CPT | Performed by: ORTHOPAEDIC SURGERY

## 2023-02-27 PROCEDURE — A4217 STERILE WATER/SALINE, 500 ML: HCPCS | Performed by: ORTHOPAEDIC SURGERY

## 2023-02-27 PROCEDURE — 7100000001 HC PACU RECOVERY - ADDTL 15 MIN: Performed by: ORTHOPAEDIC SURGERY

## 2023-02-27 PROCEDURE — 73120 X-RAY EXAM OF HAND: CPT

## 2023-02-27 PROCEDURE — 3700000001 HC ADD 15 MINUTES (ANESTHESIA): Performed by: ORTHOPAEDIC SURGERY

## 2023-02-27 PROCEDURE — 26615 TREAT METACARPAL FRACTURE: CPT | Performed by: ORTHOPAEDIC SURGERY

## 2023-02-27 PROCEDURE — 2720000010 HC SURG SUPPLY STERILE: Performed by: ORTHOPAEDIC SURGERY

## 2023-02-27 PROCEDURE — 2580000003 HC RX 258

## 2023-02-27 PROCEDURE — 2500000003 HC RX 250 WO HCPCS: Performed by: ORTHOPAEDIC SURGERY

## 2023-02-27 DEVICE — LOCKING SCREW, T6
Type: IMPLANTABLE DEVICE | Site: FINGERS | Status: FUNCTIONAL
Brand: VARIAX

## 2023-02-27 DEVICE — BONE SCREW, T6
Type: IMPLANTABLE DEVICE | Site: FINGERS | Status: FUNCTIONAL
Brand: VARIAX

## 2023-02-27 DEVICE — 2.3 M VARIAX HAND LOCK T-PLATE, NARROW
Type: IMPLANTABLE DEVICE | Site: FINGERS | Status: FUNCTIONAL
Brand: VARIAX

## 2023-02-27 DEVICE — BONE SCREW, T5
Type: IMPLANTABLE DEVICE | Site: FINGERS | Status: FUNCTIONAL
Brand: VARIAX

## 2023-02-27 RX ORDER — SODIUM CHLORIDE 0.9 % (FLUSH) 0.9 %
5-40 SYRINGE (ML) INJECTION EVERY 12 HOURS SCHEDULED
Status: DISCONTINUED | OUTPATIENT
Start: 2023-02-27 | End: 2023-02-27 | Stop reason: HOSPADM

## 2023-02-27 RX ORDER — ONDANSETRON 2 MG/ML
INJECTION INTRAMUSCULAR; INTRAVENOUS PRN
Status: DISCONTINUED | OUTPATIENT
Start: 2023-02-27 | End: 2023-02-27 | Stop reason: SDUPTHER

## 2023-02-27 RX ORDER — MIDAZOLAM HYDROCHLORIDE 1 MG/ML
INJECTION INTRAMUSCULAR; INTRAVENOUS PRN
Status: DISCONTINUED | OUTPATIENT
Start: 2023-02-27 | End: 2023-02-27 | Stop reason: SDUPTHER

## 2023-02-27 RX ORDER — HYDROCODONE BITARTRATE AND ACETAMINOPHEN 5; 325 MG/1; MG/1
1 TABLET ORAL
Status: COMPLETED | OUTPATIENT
Start: 2023-02-27 | End: 2023-02-27

## 2023-02-27 RX ORDER — CEFAZOLIN SODIUM 1 G/3ML
INJECTION, POWDER, FOR SOLUTION INTRAMUSCULAR; INTRAVENOUS PRN
Status: DISCONTINUED | OUTPATIENT
Start: 2023-02-27 | End: 2023-02-27 | Stop reason: SDUPTHER

## 2023-02-27 RX ORDER — DEXAMETHASONE SODIUM PHOSPHATE 4 MG/ML
INJECTION, SOLUTION INTRA-ARTICULAR; INTRALESIONAL; INTRAMUSCULAR; INTRAVENOUS; SOFT TISSUE PRN
Status: DISCONTINUED | OUTPATIENT
Start: 2023-02-27 | End: 2023-02-27 | Stop reason: SDUPTHER

## 2023-02-27 RX ORDER — LIDOCAINE HYDROCHLORIDE 20 MG/ML
INJECTION, SOLUTION EPIDURAL; INFILTRATION; INTRACAUDAL; PERINEURAL PRN
Status: DISCONTINUED | OUTPATIENT
Start: 2023-02-27 | End: 2023-02-27 | Stop reason: SDUPTHER

## 2023-02-27 RX ORDER — ONDANSETRON 2 MG/ML
4 INJECTION INTRAMUSCULAR; INTRAVENOUS
Status: DISCONTINUED | OUTPATIENT
Start: 2023-02-27 | End: 2023-02-27 | Stop reason: HOSPADM

## 2023-02-27 RX ORDER — HYDROCODONE BITARTRATE AND ACETAMINOPHEN 5; 325 MG/1; MG/1
1 TABLET ORAL EVERY 6 HOURS PRN
Qty: 20 TABLET | Refills: 0 | Status: SHIPPED | OUTPATIENT
Start: 2023-02-27 | End: 2023-03-04

## 2023-02-27 RX ORDER — SODIUM CHLORIDE 0.9 % (FLUSH) 0.9 %
5-40 SYRINGE (ML) INJECTION PRN
Status: DISCONTINUED | OUTPATIENT
Start: 2023-02-27 | End: 2023-02-27 | Stop reason: HOSPADM

## 2023-02-27 RX ORDER — CEPHALEXIN 500 MG/1
500 CAPSULE ORAL 4 TIMES DAILY
Qty: 20 CAPSULE | Refills: 0 | Status: SHIPPED | OUTPATIENT
Start: 2023-02-27 | End: 2023-03-04

## 2023-02-27 RX ORDER — SODIUM CHLORIDE 9 MG/ML
INJECTION, SOLUTION INTRAVENOUS PRN
Status: DISCONTINUED | OUTPATIENT
Start: 2023-02-27 | End: 2023-02-27 | Stop reason: HOSPADM

## 2023-02-27 RX ORDER — SODIUM CHLORIDE 9 MG/ML
INJECTION, SOLUTION INTRAVENOUS CONTINUOUS PRN
Status: DISCONTINUED | OUTPATIENT
Start: 2023-02-27 | End: 2023-02-27 | Stop reason: SDUPTHER

## 2023-02-27 RX ORDER — HYDROCODONE BITARTRATE AND ACETAMINOPHEN 5; 325 MG/1; MG/1
2 TABLET ORAL
Status: COMPLETED | OUTPATIENT
Start: 2023-02-27 | End: 2023-02-27

## 2023-02-27 RX ORDER — FENTANYL CITRATE 50 UG/ML
INJECTION, SOLUTION INTRAMUSCULAR; INTRAVENOUS PRN
Status: DISCONTINUED | OUTPATIENT
Start: 2023-02-27 | End: 2023-02-27 | Stop reason: SDUPTHER

## 2023-02-27 RX ORDER — FENTANYL CITRATE 50 UG/ML
25 INJECTION, SOLUTION INTRAMUSCULAR; INTRAVENOUS EVERY 5 MIN PRN
Status: DISCONTINUED | OUTPATIENT
Start: 2023-02-27 | End: 2023-02-27 | Stop reason: HOSPADM

## 2023-02-27 RX ORDER — PROPOFOL 10 MG/ML
INJECTION, EMULSION INTRAVENOUS PRN
Status: DISCONTINUED | OUTPATIENT
Start: 2023-02-27 | End: 2023-02-27 | Stop reason: SDUPTHER

## 2023-02-27 RX ORDER — BUPIVACAINE HYDROCHLORIDE 5 MG/ML
INJECTION, SOLUTION EPIDURAL; INTRACAUDAL
Status: COMPLETED | OUTPATIENT
Start: 2023-02-27 | End: 2023-02-27

## 2023-02-27 RX ADMIN — HYDROCODONE BITARTRATE AND ACETAMINOPHEN 2 TABLET: 5; 325 TABLET ORAL at 14:19

## 2023-02-27 RX ADMIN — FENTANYL CITRATE 50 MCG: 50 INJECTION INTRAMUSCULAR; INTRAVENOUS at 12:30

## 2023-02-27 RX ADMIN — DEXAMETHASONE SODIUM PHOSPHATE 8 MG: 4 INJECTION, SOLUTION INTRAMUSCULAR; INTRAVENOUS at 11:41

## 2023-02-27 RX ADMIN — HYDROMORPHONE HYDROCHLORIDE 0.5 MG: 1 INJECTION, SOLUTION INTRAMUSCULAR; INTRAVENOUS; SUBCUTANEOUS at 12:55

## 2023-02-27 RX ADMIN — HYDROMORPHONE HYDROCHLORIDE 0.5 MG: 1 INJECTION, SOLUTION INTRAMUSCULAR; INTRAVENOUS; SUBCUTANEOUS at 13:27

## 2023-02-27 RX ADMIN — LIDOCAINE HYDROCHLORIDE 100 MG: 20 INJECTION, SOLUTION EPIDURAL; INFILTRATION; INTRACAUDAL; PERINEURAL at 11:38

## 2023-02-27 RX ADMIN — SODIUM CHLORIDE: 9 INJECTION, SOLUTION INTRAVENOUS at 11:22

## 2023-02-27 RX ADMIN — ONDANSETRON 4 MG: 2 INJECTION INTRAMUSCULAR; INTRAVENOUS at 11:41

## 2023-02-27 RX ADMIN — MIDAZOLAM 2 MG: 1 INJECTION INTRAMUSCULAR; INTRAVENOUS at 11:34

## 2023-02-27 RX ADMIN — PROPOFOL 250 MG: 10 INJECTION, EMULSION INTRAVENOUS at 11:38

## 2023-02-27 RX ADMIN — HYDROMORPHONE HYDROCHLORIDE 0.5 MG: 1 INJECTION, SOLUTION INTRAMUSCULAR; INTRAVENOUS; SUBCUTANEOUS at 13:02

## 2023-02-27 RX ADMIN — CEFAZOLIN SODIUM 2 G: 1 INJECTION, POWDER, FOR SOLUTION INTRAMUSCULAR; INTRAVENOUS at 11:45

## 2023-02-27 RX ADMIN — HYDROMORPHONE HYDROCHLORIDE 0.5 MG: 1 INJECTION, SOLUTION INTRAMUSCULAR; INTRAVENOUS; SUBCUTANEOUS at 13:40

## 2023-02-27 RX ADMIN — FENTANYL CITRATE 50 MCG: 50 INJECTION INTRAMUSCULAR; INTRAVENOUS at 11:55

## 2023-02-27 ASSESSMENT — PAIN SCALES - GENERAL
PAINLEVEL_OUTOF10: 0
PAINLEVEL_OUTOF10: 9
PAINLEVEL_OUTOF10: 10
PAINLEVEL_OUTOF10: 8
PAINLEVEL_OUTOF10: 6
PAINLEVEL_OUTOF10: 8
PAINLEVEL_OUTOF10: 9
PAINLEVEL_OUTOF10: 8

## 2023-02-27 ASSESSMENT — LIFESTYLE VARIABLES: SMOKING_STATUS: 1

## 2023-02-27 ASSESSMENT — PAIN DESCRIPTION - ORIENTATION
ORIENTATION: RIGHT

## 2023-02-27 ASSESSMENT — PAIN - FUNCTIONAL ASSESSMENT
PAIN_FUNCTIONAL_ASSESSMENT: PREVENTS OR INTERFERES SOME ACTIVE ACTIVITIES AND ADLS
PAIN_FUNCTIONAL_ASSESSMENT: 0-10
PAIN_FUNCTIONAL_ASSESSMENT: ACTIVITIES ARE NOT PREVENTED

## 2023-02-27 ASSESSMENT — PAIN DESCRIPTION - LOCATION
LOCATION: HAND

## 2023-02-27 ASSESSMENT — PAIN DESCRIPTION - DESCRIPTORS
DESCRIPTORS: ACHING
DESCRIPTORS: SQUEEZING;PRESSURE

## 2023-02-27 ASSESSMENT — ENCOUNTER SYMPTOMS: SHORTNESS OF BREATH: 0

## 2023-02-27 NOTE — DISCHARGE INSTRUCTIONS
Post op instruction:  1- D/C home  2- Dx Right hand 5th MC fracture. 3- NWB right hand and left leg  4- Elevation surgical site, with ice  5- Keep splint dry and clean  6- F/U in 2 weeks.   7- For DVT prophylaxis- Aspirin 325 mg daily     Paige Chambers MD, 2/27/2023

## 2023-02-27 NOTE — H&P
Preoperative H&P Update    The patient's History and Physical in the medical record from 2/27/2023 was reviewed by me today. History reviewed. No pertinent past medical history. History reviewed. No pertinent surgical history. No current facility-administered medications on file prior to encounter. Current Outpatient Medications on File Prior to Encounter   Medication Sig Dispense Refill    naproxen (NAPROSYN) 500 MG tablet Take 1 tablet by mouth 2 times daily (with meals) 60 tablet 0       Allergies   Allergen Reactions    Bee Venom       I reviewed the HPI, medications, allergies, reason for surgery, diagnosis and treatment plan and there has been no change. The patient was evaluated by me today. Physical exam findings for this update include:    Vitals:    02/27/23 0909   BP: 114/73   Pulse: 99   Resp: 14   Temp: 99 °F (37.2 °C)   SpO2: 96%     Airway is intact  Chest: chest clear, no wheezing, rales, normal symmetric air entry, no tachypnea, retractions or cyanosis  Heart: regular rate and rhythm ; heart sounds normal  Findings on exam of the body region where surgery is to be performed include:  Right hand 5th MC fracture.     Electronically signed by Paige Chambers MD on 2/27/2023 at 11:46 AM

## 2023-02-27 NOTE — CONSULTS
[facility] Orthopedic Surgery  Consult Note         This patient is seen in consultation at the request of Dr Chad Wetzel MD    Reason for Consult:  Right hand and left knee pain/ 4th and 5th MC shaft comminuted fracture and minimally displaced medial tibial plateau fracture. CHIEF COMPLAINT:  Right hand and left knee pain/ fall. History Obtained From:  patient, electronic medical record    HISTORY OF PRESENT ILLNESS:    Mr. Yovany Alcantara 27 y.o.  male right handed presents today for consultation and evaluation of a right hand and left knee injury which occurred when he fell taking the trash out. He is complaining of ulnar hand and left knee pain and swelling. This is better with elevation and worse with ROM. The pain is sharp and not radiating. No other complaint. He was seen at [facility], where he was evaluated and splinted and Orthopedic was consulted. Past Medical History:    History reviewed. No pertinent past medical history. Past Surgical History:        Procedure Laterality Date    FINGER SURGERY Right 2023    OPEN REDUCTION INTERNAL FIXATION RIGHT HAND 5TH METACARPAL FRACTURE performed by Bertrand Hicks MD at [place] [address]       Medications prior to admission:   Prior to Admission medications    Medication Sig Start Date End Date Taking? Authorizing Provider   naproxen (NAPROSYN) 500 MG tablet Take 1 tablet by mouth 2 times daily (with meals) 10/28/22   Gomez Perez MD       Current Medications:   No current facility-administered medications for this encounter.     Allergies:  Bee venom    Social History     Socioeconomic History    Marital status: Single     Spouse name: Not on file    Number of children: Not on file    Years of education: Not on file    Highest education level: Not on file   Occupational History    Not on file   Tobacco Use    Smoking status: Former     Types: Cigarettes     Quit date: 2023     Years since quittin.0    Smokeless tobacco: Never Substance and Sexual Activity    Alcohol use: Yes     Comment: social    Drug use: Yes     Types: Marijuana [place] [employer])    Sexual activity: Yes   Other Topics Concern    Not on file   Social History Narrative    Not on file     Social Determinants of Health     Financial Resource Strain: Not on file   Food Insecurity: Not on file   Transportation Needs: Not on file   Physical Activity: Not on file   Stress: Not on file   Social Connections: Not on file   Intimate Partner Violence: Not on file   Housing Stability: Not on file       Family History:  Family History   Problem Relation Age of Onset    Cancer Maternal Aunt          of skin cancer         REVIEW OF SYSTEMS:   CONSTITUTIONAL: Denies unexplained weight loss, fevers, chills or fatigue  NEUROLOGICAL: Denies unsteady gait or progressive weakness    PSYCHOLOGICAL: Denies anxiety, depression   SKIN: Denies skin changes, delayed healing, rash, itching   HEMATOLOGIC: Denies easy bleeding or bruising  ENDOCRINE: Denies excessive thirst, urination, heat/cold  RESPIRATORY: Denies current dyspnea, cough  CARDIOVASCULAR: Negative for chest pain at this time. EYES: Negative for photophobia and visual disturbance. ENT:  Negative for rhinorrhea, epistaxis, sore throat, or hearing loss. GI: Denies nausea, vomiting, diarrhea   : Denies bowel or bladder issues   MUSCULOSKELETAL: Right hand and left knee pain. All other ROS reviewed in chart or with patient or family and are grossly negative. PHYSICAL EXAMINATION:  Mr. Valeriano Cesar is a very pleasant 27 y.o. male who seen today in no acute distress, awake, alert, and oriented. He is well nourished and groomed. Patient with normal affect. Body mass index is 18.46 kg/m². . Skin warm and dry. Resting respiratory rate is 16. Resp deep and easy.  Pulse is with regular rate and rhythm    /73   Pulse 99   Temp 99 °F (37.2 °C) (Temporal)   Resp 14   Ht 5' 9\" (1.753 m)   Wt 125 lb (56.7 kg)   SpO2 96%   BMI 18.46 kg/m²        Airway is intact  Chest: chest clear, no wheezing, rales, normal symmetric air entry, no tachypnea, retractions or cyanosis  Heart: regular rate and rhythm ; heart sounds normal   Hearing intact, pupil equal and reactive bilateral  Lymphatics; No groin or axillary enlarged lymph nodes. Neck; No swelling  Abdomen; soft, non distended. MUSCULOSKELETAL:   Examination of both upper extremities showing a decreased range of motion of the right hand compare to the other side. There is moderate swelling that can be seen, as well as ecchymosis. He has intact sensation and good radial pulses. He has significant tenderness on deep palpation over the 4th and 5th MC shaft of the right hand. There is a rotational deformity of the right small finger. Examination of both knees showing a decreased range of motion of the left knee compare to the other side. There is moderate swelling that can be seen, as well as ecchymosis. He has intact sensation and good pedal pulses. He has significant tenderness on deep palpation over the left knee.     NEUROLOGIC:   Sensory:    Touch:                     Right Upper Extremity:  normal                   Left Upper Extremity:  normal                  Right Lower Extremity:  normal                  Left Lower Extremity:  normal        DATA:    CBC:   Lab Results   Component Value Date/Time    WBC 6.5 12/16/2015 10:50 PM    RBC 4.17 12/16/2015 10:50 PM    HGB 13.6 12/16/2015 10:50 PM    HCT 41.3 12/16/2015 10:50 PM    MCV 99.1 12/16/2015 10:50 PM    MCH 32.5 12/16/2015 10:50 PM    MCHC 32.8 12/16/2015 10:50 PM    RDW 13.5 12/16/2015 10:50 PM     12/16/2015 10:50 PM    MPV 7.2 12/16/2015 10:50 PM     WBC:    Lab Results   Component Value Date/Time    WBC 6.5 12/16/2015 10:50 PM     PT/INR:  No results found for: PROTIME, INR  PTT:  No results found for: APTT[APTT    IMAGING: Xray's were reviewed, dated 2/25/2023 from [facility],  3 views of the right  hand, and showed a displaced comminuted 4th and 5th MC shaft fracture  of the right hand. CT scan and Xrays, 3 views of the left knee dated 2/25/2023 from [facility],  were reviewed, and showed a minimally displaced medial tibial plateau fracture. IMPRESSION:  1- Left knee pain / Medial tibial plateau fracture. 2- Right hand pain/ 4th and 5th MC shaft comminuted fracture. PLAN:  I discussed with Marely Hall the treatment options and that the overall alignment of the left knee tibial plateau fracture is good and we can try to treat this non-operatively in a knee brace with NWB for 6 weeks. We discussed the risk of nonunion and or malunion. I discussed with Marely Hall the overall alignment of the right hand 4th and 5th MC fracture and treatment options including both surgical and non-surgical treatment, and that my recommendation is an open reduction and internal fixation given the amount of displacement and comminution of the fracture. I discussed the risks and benefits of surgery with the patient, including but not limited to infection, bleeding, pain, injury to nerves or blood vessels failure of the surgery and need for additional surgery. All the patient's questions were answered. We discussed an expected post-operative course. He  is understanding of this and wishes to proceed. The patient smokes cigarettes, and we discussed with the patient the risks of smoking on general health and also on bone and soft tissue healing (delay and non-union), and promised to cut down or stop smoking. Smoking: Educated the patient regarding the hazards of smoking and that it harms their body in many ways. It increases the chance of developing heart disease, lung disease, cancer, and other health problems including poor bone and wound healing. The importance of smoking cessation for optimal bone and wound healing was stressed. This was communicated verbally, 5 Minutes.     Thank you very much for the kind consultation and allowing me to participate in this patient's care.   I will continue to keep you apprised of his progress.  Jared Doyle MD   2/27/2023  11:46 AM

## 2023-02-27 NOTE — ANESTHESIA POSTPROCEDURE EVALUATION
Department of Anesthesiology  Postprocedure Note    Patient: Zeus Concepcion  MRN: [id number]  YOB: 1990  Date of evaluation: 2/27/2023      Procedure Summary     Date: 02/27/23 Room / Location: [address]    Anesthesia Start: 1134 Anesthesia Stop: 1253    Procedure: OPEN REDUCTION INTERNAL FIXATION RIGHT HAND 5TH METACARPAL FRACTURE (Right: Fingers) Diagnosis:       Closed nondisplaced fracture of fifth metacarpal bone of right hand, unspecified portion of metacarpal, initial encounter      (RIGHT HAND 5TH METACARPAL FRACTURE)    Surgeons: Godwin Hernandez MD Responsible Provider: Estefania Lyn MD    Anesthesia Type: general ASA Status: 2          Anesthesia Type: No value filed.     Kamryn Phase I: Kamryn Score: 10    Kamryn Phase II: Kamryn Score: 10      Anesthesia Post Evaluation    Patient location during evaluation: PACU  Level of consciousness: awake and alert  Airway patency: patent  Nausea & Vomiting: no nausea and no vomiting  Complications: no  Cardiovascular status: blood pressure returned to baseline  Respiratory status: acceptable  Hydration status: euvolemic  Comments: Postoperative Anesthesia Note    Name:    Zeus Concepcion  MRN:      [id number]    Patient Vitals in the past 12 hrs:  02/27/23 1403, BP:113/70, Temp:97 °F (36.1 °C), Temp src:Temporal, Pulse:56, Resp:17, SpO2:94 %  02/27/23 1345, BP:100/69, Temp:97.2 °F (36.2 °C), Pulse:80, Resp:12, SpO2:95 %  02/27/23 1340, Resp:14  02/27/23 1327, Resp:13  02/27/23 1302, Resp:16  02/27/23 1255, Resp:15  02/27/23 1251, BP:112/82, Temp:97.3 °F (36.3 °C), Pulse:92, Resp:14, SpO2:99 %  02/27/23 0909, BP:114/73, Temp:99 °F (37.2 °C), Temp src:Temporal, Pulse:99, Resp:14, SpO2:96 %, Height:5' 9\" (1.753 m), Weight:125 lb (56.7 kg)     LABS:    CBC  Lab Results       Component                Value               Date/Time                  WBC                      6.5                 12/16/2015 10:50 PM HGB                      13.6                12/16/2015 10:50 PM        HCT                      41.3                12/16/2015 10:50 PM        PLT                      244                 12/16/2015 10:50 PM   RENAL  Lab Results       Component                Value               Date/Time                  NA                       137                 12/16/2015 10:50 PM        K                        3.7                 12/16/2015 10:50 PM        CL                       94 (L)              12/16/2015 10:50 PM        CO2                      18 (L)              12/16/2015 10:50 PM        BUN                      10                  12/16/2015 10:50 PM        CREATININE               1.0                 12/16/2015 10:50 PM        GLUCOSE                  98                  12/16/2015 10:50 PM   COAGS  No results found for: PROTIME, INR, APTT    Intake & Output:  @24HRIO@    Nausea & Vomiting:  No    Level of Consciousness:  Awake    Pain Assessment:  Adequate analgesia    Anesthesia Complications:  No apparent anesthetic complications    SUMMARY      Vital signs stable  OK to discharge from Stage I post anesthesia care.   Care transferred from Anesthesiology department on discharge from perioperative area

## 2023-02-27 NOTE — ANESTHESIA PRE PROCEDURE
Department of Anesthesiology  Preprocedure Note       Name:  Yadiel Kay   Age:  27 y.o.  :  1990                                          MRN:  [id number]         Date:  2023      Surgeon: Sherri Thomas):  Emily Merida MD    Procedure: Procedure(s):  OPEN REDUCTION INTERNAL FIXATION RIGHT HAND 5TH METACARPAL FRACTURE    Medications prior to admission:   Prior to Admission medications    Medication Sig Start Date End Date Taking? Authorizing Provider   naproxen (NAPROSYN) 500 MG tablet Take 1 tablet by mouth 2 times daily (with meals) 10/28/22   Juan Manuel Menon MD       Current medications:    No current facility-administered medications for this encounter. Allergies: Allergies   Allergen Reactions    Bee Venom        Problem List:  There is no problem list on file for this patient. Past Medical History:  History reviewed. No pertinent past medical history. Past Surgical History:  History reviewed. No pertinent surgical history.     Social History:    Social History     Tobacco Use    Smoking status: Former     Types: Cigarettes     Quit date: 2023     Years since quittin.0    Smokeless tobacco: Never   Substance Use Topics    Alcohol use: Yes     Comment: social                                Counseling given: Not Answered      Vital Signs (Current):   Vitals:    23 0909   BP: 114/73   Pulse: 99   Resp: 14   Temp: 99 °F (37.2 °C)   TempSrc: Temporal   SpO2: 96%   Weight: 125 lb (56.7 kg)   Height: 5' 9\" (1.753 m)                                              BP Readings from Last 3 Encounters:   23 114/73   23 120/86   10/28/22 (!) 127/98       NPO Status: Time of last liquid consumption: 230                        Time of last solid consumption: 230                        Date of last liquid consumption: 23                        Date of last solid food consumption: 23    BMI:   Wt Readings from Last 3 Encounters:   23 125 lb (56.7 kg)   02/24/23 123 lb 1.6 oz (55.8 kg)   10/28/22 129 lb (58.5 kg)     Body mass index is 18.46 kg/m². CBC:   Lab Results   Component Value Date/Time    WBC 6.5 12/16/2015 10:50 PM    RBC 4.17 12/16/2015 10:50 PM    HGB 13.6 12/16/2015 10:50 PM    HCT 41.3 12/16/2015 10:50 PM    MCV 99.1 12/16/2015 10:50 PM    RDW 13.5 12/16/2015 10:50 PM     12/16/2015 10:50 PM       CMP:   Lab Results   Component Value Date/Time     12/16/2015 10:50 PM    K 3.7 12/16/2015 10:50 PM    CL 94 12/16/2015 10:50 PM    CO2 18 12/16/2015 10:50 PM    BUN 10 12/16/2015 10:50 PM    CREATININE 1.0 12/16/2015 10:50 PM    GFRAA >60 12/16/2015 10:50 PM    LABGLOM >60 12/16/2015 10:50 PM    GLUCOSE 98 12/16/2015 10:50 PM    CALCIUM 9.3 12/16/2015 10:50 PM       POC Tests: No results for input(s): POCGLU, POCNA, POCK, POCCL, POCBUN, POCHEMO, POCHCT in the last 72 hours. Coags: No results found for: PROTIME, INR, APTT    HCG (If Applicable): No results found for: PREGTESTUR, PREGSERUM, HCG, HCGQUANT     ABGs: No results found for: PHART, PO2ART, DJY5NVD, EEO0SDM, BEART, D7ZJRHHU     Type & Screen (If Applicable):  No results found for: LABABO, LABRH    Drug/Infectious Status (If Applicable):  No results found for: HIV, HEPCAB    COVID-19 Screening (If Applicable): No results found for: COVID19        Anesthesia Evaluation  Patient summary reviewed no history of anesthetic complications:   Airway: Mallampati: II  TM distance: >3 FB   Neck ROM: full  Comment: Full beard   Mouth opening: > = 3 FB   Dental:      Comment: No loose teeth    Pulmonary: breath sounds clear to auscultation  (+) current smoker    (-) COPD, asthma, shortness of breath, recent URI and sleep apnea          Patient smoked on day of surgery.                  Cardiovascular:        (-) hypertension, valvular problems/murmurs, past MI, CAD, CABG/stent, dysrhythmias,  angina and no pulmonary hypertension      Rhythm: regular  Rate: normal Neuro/Psych:      (-) seizures, neuromuscular disease, TIA, CVA, headaches and psychiatric history           GI/Hepatic/Renal:        (-) GERD, PUD, hepatitis, liver disease, no renal disease and bowel prep       Endo/Other:        (-) diabetes mellitus, hypothyroidism, hyperthyroidism, blood dyscrasia, arthritis               Abdominal:             Vascular: Other Findings:           Anesthesia Plan      general     ASA 2       Induction: intravenous. MIPS: Postoperative opioids intended and Prophylactic antiemetics administered. Anesthetic plan and risks discussed with patient. Plan discussed with CRNA. This pre-anesthesia assessment may be used as a history and physical.    DOS STAFF ADDENDUM:    Pt seen and examined, chart reviewed (including anesthesia, drug and allergy history). No interval changes to history and physical examination. Anesthetic plan, risks, benefits, alternatives, and personnel involved discussed with patient. Patient verbalized an understanding and agrees to proceed.       Luis Hunt MD  February 27, 2023  9:22 AM      Luis Hunt MD   2/27/2023

## 2023-02-27 NOTE — TELEPHONE ENCOUNTER
Auth: NPR  Date: 02/27/23  Reference # None  Spoke with: None  Type of SX: Outpatient  Location: [facility]  CPT: 08206   DX: [phone]  SX area: [facility]  Insurance: [employer]

## 2023-02-27 NOTE — PROGRESS NOTES
Patient up to chair with no complications. Patient has no complaints of dizziness at this time. Patient ambulated to bathroom with steady gait and RN at side.

## 2023-02-28 NOTE — BRIEF OP NOTE
Brief Postoperative Note      Patient: Bhavin Katz  YOB: 1990  MRN: [id number]    Date of Procedure: 2/27/2023    Pre-Op Diagnosis: RIGHT HAND 4TH AND 5TH METACARPAL FRACTURE    Post-Op Diagnosis: Same       Procedure(s):  OPEN REDUCTION INTERNAL FIXATION RIGHT HAND 5TH METACARPAL FRACTURE    Surgeon(s):  Yamila Infante MD    Assistant:  Surgical Assistant: Bessie Londono  Physician Assistant: Wendy Garcia    Anesthesia: General    Estimated Blood Loss (mL): Minimal    Complications: None    Specimens:   * No specimens in log *    Implants:  Implant Name Type Inv. Item Serial No.  Lot No. LRB No. Used Action   PLATE BNE M R3.2BI 6 H HND TI ROHAN T CHIDI FOR 2.3MM SCR - [id number]  PLATE BNE M E3.3EV 6 H HND TI ROHAN T CHIDI FOR 2.3MM SCR  [employer] [facility]  Right 1 Implanted   SCREW BNE T5 17MM L8MM - [id number]  SCREW BNE T5 17MM L8MM  [employer]-  Right 1 Implanted   SCREW BNE L8MM [id number] T6 VARIAX - [id number]  SCREW BNE L8MM [id number] T6 VARIAX  [employer]-  Right 1 Implanted   SCREW BNE L9MM [id number] T6 VARIAX - [id number]  SCREW BNE L9MM [id number] T6 VARIAX  [employer]-  Right 1 Implanted   SCREW BNE L10MM DIA2. 3MM T6 VARIAX - [id number]  SCREW BNE L10MM DIA2. 3MM T6 VARIAX  [employer] [facility]  Right 1 Implanted   SCREW BNE L9MM [id number] CHIDI T6 VARIAX - [id number]  SCREW BNE L9MM [id number] CHIDI T6 VARIAX  [employer]-  Right 1 Implanted   SCREW BNE L11MM [id number] CHIDI T6 VARIAX - [id number]  SCREW BNE L11MM [id number] CHIDI T6 VARIAX  [employer]-  Right 1 Implanted         Drains: * No LDAs found *    Findings: Same.     Electronically signed by Yamila Infante MD on 2/27/2023 at 9:59 PM

## 2023-02-28 NOTE — OP NOTE
[address] [place] [address] [facility]                                OPERATIVE REPORT    PATIENT NAME: Ramya Lundberg                   :        1990  MED REC NO:   [id number]                          ROOM:  ACCOUNT NO:   [de-identified]                           ADMIT DATE: 2023  PROVIDER:     Leopoldo Mallory, MD    DATE OF PROCEDURE:  2023    PREOPERATIVE DIAGNOSES:  1. Right hand fifth metacarpal comminuted displaced fracture. 2.  Right hand fourth metacarpal displaced fracture. POSTOPERATIVE DIAGNOSES:  1. Right hand fifth metacarpal comminuted displaced fracture. 2.  Right hand fourth metacarpal displaced fracture. OPERATION PERFORMED:  1. Open treatment of right hand fifth metacarpal comminuted displaced  fracture with open reduction and internal fixation. 2.  Open treatment of right hand fourth metacarpal fracture without  internal fixation. SURGEON:  Leopoldo Mallory, MD ASSISTANTElois Rotunda, Surgical Assistant. ANESTHESIA:  General anesthesia. ESTIMATED BLOOD LOSS:  Minimal.    COMPLICATIONS:  None. TOURNIQUET:  Right upper hand 250 mmHg. IMPLANTS USED:  Danitza titanium 2.3 T-shaped VariAx locking plate. INDICATIONS:  This is a 64-year-old white male right-hand dominant who  sustained a fall as he was taking the trash out with a right hand as  well as left knee injury. He was seen initially at [facility] ER where he  was found to have a comminuted displaced fourth and fifth metacarpal  fracture as well as a minimally displaced left knee medial tibial  plateau fracture. Orthopedic consultation was placed. Dr. Soumya Concepcion was  contacted and he referred the patient to me for further evaluation and  surgical treatment. All risks, benefits, and alternatives were  discussed with the patient.   He agreed to proceed with surgical fixation  of the right hand and decision was made at this point to treat the left  knee nonsurgically. OPERATIVE PROCEDURE:  The patient's right hand was marked. He received  2 gm Ancef IV preoperatively. The patient was then brought to the  operating room and underwent general anesthesia. A well-padded  tourniquet was placed right upper arm. The right upper extremity was  then prepped and draped in regular sterile routine fashion. A time-out  was called confirming the patient's name, site, and procedure. Esmarch was used for exsanguination, tourniquet inflated to 250 mmHg. A  dorsal incision was made over the fifth metacarpal distal shaft area. Careful dissection was performed. We identified and protected extensor  tendon. We exposed the fracture, it was found to be markedly displaced  and comminuted at least four-part fracture. It was significantly  challenging, physically and mentally very difficult because of  instability of the fracture. We carefully were able to manipulate the  fracture, able to reduce it anatomically. While maintaining the  reduction, we placed a K-wire for provisional fixation. While  maintaining the reduction, we put the plate in appropriate position. We  put a lag screw to stabilize it provisionally. While maintaining the  reduction with the lag screw, we put a 2.3 VariAx locking plate from  [employer] in appropriate position. We put one screw proximally and one  screw distally and then we confirmed with the C-arm that fracture was in  good anatomic position. We went ahead and locked it in place with two  locking screws distally and one proximally. The fourth metacarpal was  then reduced with open reduction, but because of the stable fracture at  this point we elected not to place any internal fixation. We confirmed  that with the mini C-arm that the fourth is in good anatomic position. At this point, we let the tourniquet down. Hemostasis was secured. We  irrigated the incision copiously with normal saline.   We then closed the  subcu with a 3-0 Vicryl and the skin with a 4-0 Monocryl. Steri-Strips  were then applied. Dressing was applied in the form of Xeroform, 4x4,  sterile Webril, and an ulnar gutter splint was applied. The patient tolerated the procedure well, was taken to Recovery in  stable condition. POSTOPERATIVE PLAN:  The patient will be discharged home. He will be  nonweightbearing on the right hand as well as the left knee because of  displacement of fracture. He will be seen in the office in 7 to 10  days. We will repeat the x-ray of the knee to confirm that the fracture  is still not displaced. If there is anymore displacement we may  reconsider treatment option with open reduction and internal fixation of  tibial plateau, otherwise we will treat nonsurgically with  nonweightbearing for six weeks. He can start range of motion of the  right hand in two weeks.         Adam Mancia MD    D: 02/28/2023 7:24:06       T: 02/28/2023 16:09:28     [email or web address]  Job#: [id number]     Doc#: [id number]    CC:

## 2023-03-15 DIAGNOSIS — S62.314A CLOSED DISPLACED FRACTURE OF BASE OF FOURTH METACARPAL BONE OF RIGHT HAND, INITIAL ENCOUNTER: ICD-10-CM

## 2023-03-15 DIAGNOSIS — S62.326A CLOSED DISPLACED FRACTURE OF SHAFT OF FIFTH METACARPAL BONE OF RIGHT HAND, INITIAL ENCOUNTER: Primary | ICD-10-CM

## 2023-03-15 RX ORDER — HYDROCODONE BITARTRATE AND ACETAMINOPHEN 5; 325 MG/1; MG/1
1 TABLET ORAL EVERY 8 HOURS PRN
Qty: 15 TABLET | Refills: 0 | Status: SHIPPED | OUTPATIENT
Start: 2023-03-15 | End: 2023-03-20

## 2023-03-15 NOTE — TELEPHONE ENCOUNTER
Per OP note, patient should have followed up 7-10 days after sx for hand and knee injuries. Needs follow up ASAP. Will ask provider about pain meds.

## 2023-03-15 NOTE — TELEPHONE ENCOUNTER
Pt. Scheduled for post op on 3-  Pt. States pain ranges 8-10/10. States he does not use his right hand at all.

## 2023-03-15 NOTE — TELEPHONE ENCOUNTER
General Question     Subject: R HAND PAIN   Patient and /or Facility Request: Braxton Melodie  Contact Number: [phone]    PATIENT CALLED IN TO SEE IF HE CAN TALK TO SOMEONE IN THE OFFICE REGARDING HIS R HAND PAIN. .. PATIENT IS ONLY SLEEPING 3 TO 4 HOURS A DAY. Analy Breaux LOOKING FOR PAIN MEDICATION. .. PATIENT WILL LIKE TO GO TO BACK TO WORK SOON. .. PLEASE CALL PATIENT BACK AT THE ABOVE NUMBER. ..

## 2023-03-22 ENCOUNTER — OFFICE VISIT (OUTPATIENT)
Dept: ORTHOPEDIC SURGERY | Age: 33
End: 2023-03-22
Payer: MEDICAID

## 2023-03-22 VITALS — HEIGHT: 69 IN | BODY MASS INDEX: 18.51 KG/M2 | WEIGHT: 125 LBS

## 2023-03-22 DIAGNOSIS — S62.314A CLOSED DISPLACED FRACTURE OF BASE OF FOURTH METACARPAL BONE OF RIGHT HAND, INITIAL ENCOUNTER: ICD-10-CM

## 2023-03-22 DIAGNOSIS — S62.326A CLOSED DISPLACED FRACTURE OF SHAFT OF FIFTH METACARPAL BONE OF RIGHT HAND, INITIAL ENCOUNTER: Primary | ICD-10-CM

## 2023-03-22 PROCEDURE — 99024 POSTOP FOLLOW-UP VISIT: CPT | Performed by: NURSE PRACTITIONER

## 2023-03-22 PROCEDURE — APPNB15 APP NON BILLABLE TIME 0-15 MINS: Performed by: NURSE PRACTITIONER

## 2023-03-22 PROCEDURE — L3807 WHFO W/O JOINTS PRE CST: HCPCS | Performed by: NURSE PRACTITIONER

## 2023-03-22 RX ORDER — TRAMADOL HYDROCHLORIDE 50 MG/1
50 TABLET ORAL EVERY 6 HOURS PRN
Qty: 20 TABLET | Refills: 0 | Status: SHIPPED | OUTPATIENT
Start: 2023-03-22 | End: 2023-03-27

## 2023-03-22 NOTE — PROGRESS NOTES
DIAGNOSIS:    1-Right 5th MC shaft fracture, status post ORIF. 2-Right 4th  MC shaft displaced fracture, status post open treatment without fixation    DATE OF SURGERY: 2/27/2023. HISTORY OF PRESENT ILLNESS:  Mr. Brittney Prasad 27 y.o.  male right handed returns today  for 2 weeks postoperative visit. The patient denies any significant pain in the right hand. Rates pain a 7/10 VAS moderate, aching, intermittent and are improving. Aggravating factors movement. Alleviating factors elevation and rest.No numbness or tingling sensation. No fever or Chills. He  is in a splint. He is a smoker. He works as a . PHYSICAL EXAMINATION:  The incision is completely healed . No signs of any erythema or drainage. He has no pain with the active or passive range of motion of the right hand, but decrease ROM. He  has intact sensation, distally, and he  is neurovascularly intact. IMAGING:  Three views right hand showed anatomic alignment of right 4th and 5th  MC fracture with the 5th MC plate and screws in good position, no loosening. IMPRESSION:  2 weeks out from   1-Right 5th [facility] shaft fracture, status post ORIF. 2-Right 4th   shaft displaced fracture, status post open treatment without fixation      PLAN:  I have told the patient to work on ROM, as well as strengthening exercises. A removable wrist brace applied and instructed in care. No heavy impact activities. The patient will come back for a follow up in 6 weeks. At that time, we will take 3 views of the right hand. OT if needed then. The patient smokes cigarettes, and we discussed with the patient the risks of smoking on general health and also on bone and soft tissue healing (delay and non-union), and promised to cut down or stop smoking. Smoking: Educated the patient regarding the hazards of smoking and that it harms their body in many ways.  It increases the chance of developing heart disease, lung disease, cancer, and other health

## 2023-05-19 ENCOUNTER — HOSPITAL ENCOUNTER (EMERGENCY)
Age: 33
Discharge: HOME | End: 2023-05-20
Payer: MEDICAID

## 2023-05-19 ENCOUNTER — APPOINTMENT (OUTPATIENT)
Dept: GENERAL RADIOLOGY | Age: 33
End: 2023-05-19
Payer: MEDICAID

## 2023-05-19 DIAGNOSIS — M79.641 HAND PAIN, RIGHT: Primary | ICD-10-CM

## 2023-05-19 PROCEDURE — 73130 X-RAY EXAM OF HAND: CPT

## 2023-05-19 PROCEDURE — 99283 EMERGENCY DEPT VISIT LOW MDM: CPT

## 2023-05-20 ENCOUNTER — APPOINTMENT (OUTPATIENT)
Dept: GENERAL RADIOLOGY | Age: 33
End: 2023-05-20
Payer: MEDICAID

## 2023-05-20 ENCOUNTER — HOSPITAL ENCOUNTER (EMERGENCY)
Age: 33
Discharge: HOME | End: 2023-05-20
Attending: EMERGENCY MEDICINE
Payer: MEDICAID

## 2023-05-20 VITALS
DIASTOLIC BLOOD PRESSURE: 84 MMHG | HEIGHT: 70 IN | BODY MASS INDEX: 16.32 KG/M2 | OXYGEN SATURATION: 98 % | HEART RATE: 81 BPM | TEMPERATURE: 98.1 F | RESPIRATION RATE: 16 BRPM | SYSTOLIC BLOOD PRESSURE: 125 MMHG | WEIGHT: 114 LBS

## 2023-05-20 VITALS
RESPIRATION RATE: 18 BRPM | BODY MASS INDEX: 16.74 KG/M2 | HEIGHT: 69 IN | OXYGEN SATURATION: 98 % | HEART RATE: 84 BPM | DIASTOLIC BLOOD PRESSURE: 82 MMHG | WEIGHT: 113 LBS | SYSTOLIC BLOOD PRESSURE: 130 MMHG | TEMPERATURE: 97.7 F

## 2023-05-20 DIAGNOSIS — M25.562 LEFT KNEE PAIN, UNSPECIFIED CHRONICITY: Primary | ICD-10-CM

## 2023-05-20 PROCEDURE — 73562 X-RAY EXAM OF KNEE 3: CPT

## 2023-05-20 PROCEDURE — 99283 EMERGENCY DEPT VISIT LOW MDM: CPT

## 2023-05-20 PROCEDURE — 6370000000 HC RX 637 (ALT 250 FOR IP): Performed by: EMERGENCY MEDICINE

## 2023-05-20 RX ORDER — IBUPROFEN 400 MG/1
800 TABLET ORAL ONCE
Status: COMPLETED | OUTPATIENT
Start: 2023-05-20 | End: 2023-05-20

## 2023-05-20 RX ORDER — IBUPROFEN 800 MG/1
800 TABLET ORAL EVERY 8 HOURS PRN
Qty: 30 TABLET | Refills: 0 | Status: SHIPPED | OUTPATIENT
Start: 2023-05-20

## 2023-05-20 RX ADMIN — IBUPROFEN 800 MG: 400 TABLET, FILM COATED ORAL at 06:03

## 2023-05-20 ASSESSMENT — PAIN SCALES - GENERAL: PAINLEVEL_OUTOF10: 8

## 2023-05-20 NOTE — ED PROVIDER NOTES
Repeat Heart rate is 84    There is some concern that this patient's function of his right hand has not returned and he will be encouraged to follow-up with his orthopedic doctor for reevaluation. As he is hemodynamically stable, without signs of infection, without new injuries, I feel he is appropriate for discharge home with this plan and return precautions. He is in agreement with this plan and expressed verbal understanding prior to discharge. Is this patient to be included in the SEP-1 core measure due to severe sepsis or septic shock? No Exclusion criteria - the patient is NOT to be included for SEP-1 Core Measure due to: 2+ SIRS criteria are not met    This patient was also evaluated by the attending physician. All care plans were discussed and agreed upon. Clinical Impression     1. Hand pain, right        Disposition     PATIENT REFERRED TO:  No follow-up provider specified.     DISCHARGE MEDICATIONS:  New Prescriptions    No medications on file       [facility]  05/20/23 0008

## 2023-05-20 NOTE — ED NOTES
Pt discharged to home, alert and oriented. Denies any questions about discharge instructions. Will follow up as directed. encouraged to return for any worsening symptoms.         Isrrael Brock RN  05/20/23 [id number]

## 2023-05-20 NOTE — ED PROVIDER NOTES
[address] [facility]          ATTENDING PHYSICIAN NOTE       Date of evaluation: 5/20/2023    Chief Complaint     Knee Pain (C/o chronic left knee pain since MRI done in March and showed fracture, \" If I walk 5-6 miles it hurts, I would like evaluated also\")      History of Present Illness     Pelon Crawford is a 27 y.o. male who presents from the emergency department lobby to which she was discharged just a few hours ago requesting evaluation of his left knee. He was here for evaluation of hand pain previously and was waiting in the waiting room and could not get a ride for several hours and therefore he thought he might as well have his left knee evaluated because it was painful. He sustained a left tibial plateau fracture and was seen and evaluated in February for this and also fractures of the fourth and fifth metacarpals on the right hand. The knee was not operatively managed but instead he was placed in a brace for 6 weeks. Surgery was performed on the fourth and fifth metacarpals. He was evaluated earlier in the evening for the hand pain and now wishes to have his knee evaluated. He never followed up with orthopedics regarding the knee after he finished his time in the brace. Denies new injury. Review of Systems     Denies numbness, paresthesia, weakness. See HPI for further details. Review of systems otherwise negative. Past Medical, Surgical, Family, and Social History     Nursing Notes, Past Medical Hx, Past Surgical Hx, Social Hx, Allergies, and Family Hx were all reviewed in the electronic record and agreed with, or any disagreements were addressed in the HPI or corrected in the EMR.     Medications     Discharge Medication List as of 5/20/2023  6:20 AM        CONTINUE these medications which have NOT CHANGED    Details   ibuprofen (ADVIL;MOTRIN) 800 MG tablet Take 1 tablet by mouth every 8 hours as needed for Pain, Disp-30 tablet, R-0Print

## 2023-05-20 NOTE — DISCHARGE INSTRUCTIONS
As we discussed, follow-up with your hand surgeon. Dr Rebeca Cooper info is provided above. Return to the emergency department sooner with new injuries, intolerable pain, fevers, other concerning symptoms.

## 2023-10-18 ENCOUNTER — HOSPITAL ENCOUNTER (EMERGENCY)
Age: 33
Discharge: HOME | End: 2023-10-19
Attending: EMERGENCY MEDICINE
Payer: MEDICAID

## 2023-10-18 DIAGNOSIS — F10.20 UNCOMPLICATED ALCOHOL DEPENDENCE (HCC): Primary | ICD-10-CM

## 2023-10-18 LAB
ALBUMIN SERPL-MCNC: 5.1 G/DL (ref 3.4–5)
ALBUMIN/GLOB SERPL: 2 {RATIO} (ref 1.1–2.2)
ALP SERPL-CCNC: 92 U/L (ref 40–129)
ALT SERPL-CCNC: 14 U/L (ref 10–40)
ANION GAP SERPL CALCULATED.3IONS-SCNC: 16 MMOL/L (ref 3–16)
AST SERPL-CCNC: 19 U/L (ref 15–37)
BASOPHILS # BLD: 0 K/UL (ref 0–0.2)
BASOPHILS NFR BLD: 0.3 %
BILIRUB SERPL-MCNC: 0.3 MG/DL (ref 0–1)
BUN SERPL-MCNC: 7 MG/DL (ref 7–20)
CALCIUM SERPL-MCNC: 9.4 MG/DL (ref 8.3–10.6)
CHLORIDE SERPL-SCNC: 103 MMOL/L (ref 99–110)
CO2 SERPL-SCNC: 22 MMOL/L (ref 21–32)
CREAT SERPL-MCNC: 0.9 MG/DL (ref 0.9–1.3)
DEPRECATED RDW RBC AUTO: 15.8 % (ref 12.4–15.4)
EOSINOPHIL # BLD: 0.2 K/UL (ref 0–0.6)
EOSINOPHIL NFR BLD: 1.6 %
GFR SERPLBLD CREATININE-BSD FMLA CKD-EPI: >60 ML/MIN/{1.73_M2}
GLUCOSE SERPL-MCNC: 111 MG/DL (ref 70–99)
HCT VFR BLD AUTO: 45.5 % (ref 40.5–52.5)
HGB BLD-MCNC: 16.1 G/DL (ref 13.5–17.5)
LYMPHOCYTES # BLD: 3.3 K/UL (ref 1–5.1)
LYMPHOCYTES NFR BLD: 33 %
MCH RBC QN AUTO: 34 PG (ref 26–34)
MCHC RBC AUTO-ENTMCNC: 35.5 G/DL (ref 31–36)
MCV RBC AUTO: 95.8 FL (ref 80–100)
MONOCYTES # BLD: 0.5 K/UL (ref 0–1.3)
MONOCYTES NFR BLD: 4.9 %
NEUTROPHILS # BLD: 6.1 K/UL (ref 1.7–7.7)
NEUTROPHILS NFR BLD: 60.2 %
PLATELET # BLD AUTO: 406 K/UL (ref 135–450)
PMV BLD AUTO: 6.9 FL (ref 5–10.5)
POTASSIUM SERPL-SCNC: 3.8 MMOL/L (ref 3.5–5.1)
PROT SERPL-MCNC: 7.6 G/DL (ref 6.4–8.2)
RBC # BLD AUTO: 4.75 M/UL (ref 4.2–5.9)
SODIUM SERPL-SCNC: 141 MMOL/L (ref 136–145)
WBC # BLD AUTO: 10.1 K/UL (ref 4–11)

## 2023-10-18 PROCEDURE — 6370000000 HC RX 637 (ALT 250 FOR IP): Performed by: PHYSICIAN ASSISTANT

## 2023-10-18 PROCEDURE — 2580000003 HC RX 258: Performed by: PHYSICIAN ASSISTANT

## 2023-10-18 PROCEDURE — 93005 ELECTROCARDIOGRAM TRACING: CPT | Performed by: EMERGENCY MEDICINE

## 2023-10-18 PROCEDURE — 85025 COMPLETE CBC W/AUTO DIFF WBC: CPT

## 2023-10-18 PROCEDURE — 6360000002 HC RX W HCPCS: Performed by: PHYSICIAN ASSISTANT

## 2023-10-18 PROCEDURE — 99284 EMERGENCY DEPT VISIT MOD MDM: CPT

## 2023-10-18 PROCEDURE — 2500000003 HC RX 250 WO HCPCS: Performed by: PHYSICIAN ASSISTANT

## 2023-10-18 PROCEDURE — 96375 TX/PRO/DX INJ NEW DRUG ADDON: CPT

## 2023-10-18 PROCEDURE — 80053 COMPREHEN METABOLIC PANEL: CPT

## 2023-10-18 PROCEDURE — 96365 THER/PROPH/DIAG IV INF INIT: CPT

## 2023-10-18 RX ORDER — NALTREXONE HYDROCHLORIDE 50 MG/1
50 TABLET, FILM COATED ORAL ONCE
Status: COMPLETED | OUTPATIENT
Start: 2023-10-18 | End: 2023-10-18

## 2023-10-18 RX ORDER — DIAZEPAM 10 MG/1
10 TABLET ORAL ONCE
Status: COMPLETED | OUTPATIENT
Start: 2023-10-18 | End: 2023-10-18

## 2023-10-18 RX ORDER — ONDANSETRON 2 MG/ML
4 INJECTION INTRAMUSCULAR; INTRAVENOUS ONCE
Status: COMPLETED | OUTPATIENT
Start: 2023-10-18 | End: 2023-10-18

## 2023-10-18 RX ADMIN — THIAMINE HYDROCHLORIDE: 100 INJECTION, SOLUTION INTRAMUSCULAR; INTRAVENOUS at 20:39

## 2023-10-18 RX ADMIN — ONDANSETRON 4 MG: 2 INJECTION INTRAMUSCULAR; INTRAVENOUS at 20:39

## 2023-10-18 RX ADMIN — NALTREXONE HYDROCHLORIDE 50 MG: 50 TABLET, FILM COATED ORAL at 20:39

## 2023-10-18 RX ADMIN — DIAZEPAM 10 MG: 10 TABLET ORAL at 21:49

## 2023-10-18 ASSESSMENT — PAIN DESCRIPTION - LOCATION: LOCATION: HAND

## 2023-10-18 ASSESSMENT — PAIN DESCRIPTION - DESCRIPTORS: DESCRIPTORS: THROBBING

## 2023-10-18 ASSESSMENT — LIFESTYLE VARIABLES
HOW MANY STANDARD DRINKS CONTAINING ALCOHOL DO YOU HAVE ON A TYPICAL DAY: 5 OR 6
HOW OFTEN DO YOU HAVE A DRINK CONTAINING ALCOHOL: 4 OR MORE TIMES A WEEK

## 2023-10-18 ASSESSMENT — PAIN DESCRIPTION - PAIN TYPE: TYPE: CHRONIC PAIN

## 2023-10-18 ASSESSMENT — PAIN SCALES - GENERAL: PAINLEVEL_OUTOF10: 7

## 2023-10-18 ASSESSMENT — PAIN - FUNCTIONAL ASSESSMENT: PAIN_FUNCTIONAL_ASSESSMENT: 0-10

## 2023-10-18 ASSESSMENT — PAIN DESCRIPTION - FREQUENCY: FREQUENCY: CONTINUOUS

## 2023-10-18 ASSESSMENT — PAIN DESCRIPTION - ORIENTATION: ORIENTATION: RIGHT

## 2023-10-18 NOTE — ED TRIAGE NOTES
Known alcoholic. Afraid he might be having a withdrawal and have a seizure. Started having hand tremors an hour ago. Tachycardic on arrival. EKG done . No feeling of palpitation or chest pain. GCS 15/15.

## 2023-10-19 VITALS
HEIGHT: 70 IN | RESPIRATION RATE: 19 BRPM | BODY MASS INDEX: 20.04 KG/M2 | SYSTOLIC BLOOD PRESSURE: 138 MMHG | DIASTOLIC BLOOD PRESSURE: 77 MMHG | WEIGHT: 140 LBS | HEART RATE: 100 BPM | OXYGEN SATURATION: 96 % | TEMPERATURE: 98.1 F

## 2023-10-19 LAB
EKG ATRIAL RATE: 112 BPM
EKG DIAGNOSIS: NORMAL
EKG P AXIS: 33 DEGREES
EKG P-R INTERVAL: 112 MS
EKG Q-T INTERVAL: 322 MS
EKG QRS DURATION: 86 MS
EKG QTC CALCULATION (BAZETT): 439 MS
EKG R AXIS: 73 DEGREES
EKG T AXIS: 62 DEGREES
EKG VENTRICULAR RATE: 112 BPM

## 2023-10-19 PROCEDURE — 6370000000 HC RX 637 (ALT 250 FOR IP): Performed by: EMERGENCY MEDICINE

## 2023-10-19 RX ORDER — DIAZEPAM 10 MG/1
10 TABLET ORAL ONCE
Status: COMPLETED | OUTPATIENT
Start: 2023-10-19 | End: 2023-10-19

## 2023-10-19 RX ADMIN — DIAZEPAM 10 MG: 10 TABLET ORAL at 06:31

## 2023-10-19 ASSESSMENT — PAIN - FUNCTIONAL ASSESSMENT: PAIN_FUNCTIONAL_ASSESSMENT: NONE - DENIES PAIN

## 2023-10-19 NOTE — DISCHARGE INSTRUCTIONS
Please go to the rehab facility in [place], [place], for management of your alcohol dependence.

## 2023-10-19 NOTE — ED NOTES
Gave patient turkey sandwich box and orange juice. Tolerated well.       Stefany Bravo RN  10/19/23 [id number]

## 2023-10-30 ASSESSMENT — ENCOUNTER SYMPTOMS
VOMITING: 0
RHINORRHEA: 0
WHEEZING: 0
EYE PAIN: 0
COLOR CHANGE: 0
ABDOMINAL DISTENTION: 0
ABDOMINAL PAIN: 0
SORE THROAT: 0
NAUSEA: 0
SHORTNESS OF BREATH: 0
COUGH: 0
TROUBLE SWALLOWING: 0
DIARRHEA: 0
CHEST TIGHTNESS: 0
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
## 2025-01-08 ENCOUNTER — HOSPITAL ENCOUNTER (EMERGENCY)
Age: 35
Discharge: HOME | End: 2025-01-09
Attending: EMERGENCY MEDICINE
Payer: MEDICAID

## 2025-01-08 DIAGNOSIS — F10.10 ALCOHOL ABUSE: Primary | ICD-10-CM

## 2025-01-08 DIAGNOSIS — E87.6 HYPOKALEMIA: ICD-10-CM

## 2025-01-08 PROCEDURE — 99284 EMERGENCY DEPT VISIT MOD MDM: CPT

## 2025-01-08 PROCEDURE — 96360 HYDRATION IV INFUSION INIT: CPT

## 2025-01-08 PROCEDURE — 96361 HYDRATE IV INFUSION ADD-ON: CPT

## 2025-01-08 RX ORDER — 0.9 % SODIUM CHLORIDE 0.9 %
1000 INTRAVENOUS SOLUTION INTRAVENOUS ONCE
Status: COMPLETED | OUTPATIENT
Start: 2025-01-08 | End: 2025-01-09

## 2025-01-08 ASSESSMENT — PAIN DESCRIPTION - DESCRIPTORS: DESCRIPTORS: ACHING

## 2025-01-08 ASSESSMENT — LIFESTYLE VARIABLES
HOW OFTEN DO YOU HAVE A DRINK CONTAINING ALCOHOL: 4 OR MORE TIMES A WEEK
HOW MANY STANDARD DRINKS CONTAINING ALCOHOL DO YOU HAVE ON A TYPICAL DAY: 1 OR 2

## 2025-01-08 ASSESSMENT — PAIN - FUNCTIONAL ASSESSMENT: PAIN_FUNCTIONAL_ASSESSMENT: 0-10

## 2025-01-08 ASSESSMENT — PAIN SCALES - GENERAL: PAINLEVEL_OUTOF10: 8

## 2025-01-08 ASSESSMENT — PAIN DESCRIPTION - LOCATION: LOCATION: FACE

## 2025-01-09 VITALS
DIASTOLIC BLOOD PRESSURE: 97 MMHG | BODY MASS INDEX: 17.77 KG/M2 | RESPIRATION RATE: 18 BRPM | HEART RATE: 100 BPM | SYSTOLIC BLOOD PRESSURE: 133 MMHG | OXYGEN SATURATION: 97 % | TEMPERATURE: 98.4 F | HEIGHT: 69 IN | WEIGHT: 120 LBS

## 2025-01-09 LAB
ALBUMIN SERPL-MCNC: 3.5 G/DL (ref 3.4–5)
ALBUMIN/GLOB SERPL: 1.7 {RATIO} (ref 1.1–2.2)
ALP SERPL-CCNC: 124 U/L (ref 40–129)
ALT SERPL-CCNC: 149 U/L (ref 10–40)
ANION GAP SERPL CALCULATED.3IONS-SCNC: 16 MMOL/L (ref 9–17)
AST SERPL-CCNC: 205 U/L (ref 15–37)
BASOPHILS # BLD: 0.02 K/UL
BASOPHILS NFR BLD: 0 % (ref 0–1)
BILIRUB SERPL-MCNC: 0.3 MG/DL (ref 0–1)
BUN SERPL-MCNC: 4 MG/DL (ref 7–20)
CALCIUM SERPL-MCNC: 7.9 MG/DL (ref 8.3–10.6)
CHLORIDE SERPL-SCNC: 99 MMOL/L (ref 99–110)
CO2 SERPL-SCNC: 26 MMOL/L (ref 21–32)
CREAT SERPL-MCNC: 0.7 MG/DL (ref 0.9–1.3)
EOSINOPHIL # BLD: 0.08 K/UL
EOSINOPHILS RELATIVE PERCENT: 1 % (ref 0–3)
ERYTHROCYTE [DISTWIDTH] IN BLOOD BY AUTOMATED COUNT: 16.4 % (ref 11.7–14.9)
ETHANOLAMINE SERPL-MCNC: 249 MG/DL (ref 0–0.08)
ETHANOLAMINE SERPL-MCNC: 420 MG/DL (ref 0–0.08)
GFR, ESTIMATED: >90 ML/MIN/1.73M2
GLUCOSE SERPL-MCNC: 128 MG/DL (ref 74–99)
HCT VFR BLD AUTO: 33.6 % (ref 42–52)
HGB BLD-MCNC: 11.6 G/DL (ref 13.5–18)
IMM GRANULOCYTES # BLD AUTO: 0.03 K/UL
IMM GRANULOCYTES NFR BLD: 1 %
LYMPHOCYTES NFR BLD: 1.81 K/UL
LYMPHOCYTES RELATIVE PERCENT: 31 % (ref 24–44)
MCH RBC QN AUTO: 34.2 PG (ref 27–31)
MCHC RBC AUTO-ENTMCNC: 34.5 G/DL (ref 32–36)
MCV RBC AUTO: 99.1 FL (ref 78–100)
MONOCYTES NFR BLD: 0.36 K/UL
MONOCYTES NFR BLD: 6 % (ref 0–4)
NEUTROPHILS NFR BLD: 61 % (ref 36–66)
NEUTS SEG NFR BLD: 3.64 K/UL
PLATELET # BLD AUTO: 149 K/UL (ref 140–440)
PMV BLD AUTO: 9.2 FL (ref 7.5–11.1)
POTASSIUM SERPL-SCNC: 2.9 MMOL/L (ref 3.5–5.1)
PROT SERPL-MCNC: 5.6 G/DL (ref 6.4–8.2)
RBC # BLD AUTO: 3.39 M/UL (ref 4.6–6.2)
SODIUM SERPL-SCNC: 140 MMOL/L (ref 136–145)
WBC OTHER # BLD: 5.9 K/UL (ref 4–10.5)

## 2025-01-09 PROCEDURE — G0480 DRUG TEST DEF 1-7 CLASSES: HCPCS

## 2025-01-09 PROCEDURE — 85025 COMPLETE CBC W/AUTO DIFF WBC: CPT

## 2025-01-09 PROCEDURE — 2580000003 HC RX 258: Performed by: PHYSICIAN ASSISTANT

## 2025-01-09 PROCEDURE — 80053 COMPREHEN METABOLIC PANEL: CPT

## 2025-01-09 PROCEDURE — 6370000000 HC RX 637 (ALT 250 FOR IP): Performed by: PHYSICIAN ASSISTANT

## 2025-01-09 RX ORDER — POTASSIUM CHLORIDE 7.45 MG/ML
10 INJECTION INTRAVENOUS ONCE
Status: DISCONTINUED | OUTPATIENT
Start: 2025-01-09 | End: 2025-01-09 | Stop reason: HOSPADM

## 2025-01-09 RX ORDER — POTASSIUM CHLORIDE 1500 MG/1
20 TABLET, EXTENDED RELEASE ORAL DAILY
Qty: 10 TABLET | Refills: 0 | Status: SHIPPED | OUTPATIENT
Start: 2025-01-09 | End: 2025-01-19

## 2025-01-09 RX ORDER — POTASSIUM CHLORIDE 1500 MG/1
40 TABLET, EXTENDED RELEASE ORAL ONCE
Status: COMPLETED | OUTPATIENT
Start: 2025-01-09 | End: 2025-01-09

## 2025-01-09 RX ORDER — 0.9 % SODIUM CHLORIDE 0.9 %
1000 INTRAVENOUS SOLUTION INTRAVENOUS ONCE
Status: COMPLETED | OUTPATIENT
Start: 2025-01-09 | End: 2025-01-09

## 2025-01-09 RX ADMIN — SODIUM CHLORIDE 1000 ML: 9 INJECTION, SOLUTION INTRAVENOUS at 00:04

## 2025-01-09 RX ADMIN — POTASSIUM CHLORIDE 40 MEQ: 1500 TABLET, EXTENDED RELEASE ORAL at 04:14

## 2025-01-09 RX ADMIN — SODIUM CHLORIDE 1000 ML: 9 INJECTION, SOLUTION INTRAVENOUS at 01:07
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
## 2025-01-09 NOTE — DISCHARGE INSTRUCTIONS
.  Your potassium today was mildly low.  Good sources of potassium include leafy green vegetables, certain fruits like bananas.    You are being placed on a short course of potassium for home.    If you develop any worsening or concerning symptoms, please seek immediate medical evaluation

## 2025-01-09 NOTE — ED PROVIDER NOTES
Emergency Department Encounter    Patient: Pipe Garcia  MRN: [id number]  : 1990  Date of Evaluation: 2025  ED Provider:  Desiree Guerra MD    Briefly, Pipe Garcia is a 34 y.o. male presented to the emergency department for alcohol intoxication.,  From his rehab facility.  He came for medical clearance.  He was seen by HARRY    I have reviewed and interpreted all of the currently available lab results from this visit (if applicable)  Results for orders placed or performed during the hospital encounter of 25   Ethanol   Result Value Ref Range    Ethanol Lvl 420 (H) <10 mg/dL   CBC with Auto Differential   Result Value Ref Range    WBC 5.9 4.0 - 10.5 k/uL    RBC 3.39 (L) 4.60 - 6.20 m/uL    Hemoglobin 11.6 (L) 13.5 - 18.0 g/dL    Hematocrit 33.6 (L) 42.0 - 52.0 %    MCV 99.1 78.0 - 100.0 fL    MCH 34.2 (H) 27.0 - 31.0 pg    MCHC 34.5 32.0 - 36.0 g/dL    RDW 16.4 (H) 11.7 - 14.9 %    Platelets 149 140 - 440 k/uL    MPV 9.2 7.5 - 11.1 fL    Neutrophils % 61 36 - 66 %    Lymphocytes % 31 24 - 44 %    Monocytes % 6 (H) 0 - 4 %    Eosinophils % 1 0.0 - 3.0 %    Basophils % 0 0 - 1 %    Immature Granulocytes % 1 (H) 0 %    Neutrophils Absolute 3.64 k/uL    Lymphocytes Absolute 1.81 k/uL    Monocytes Absolute 0.36 k/uL    Eosinophils Absolute 0.08 k/uL    Basophils Absolute 0.02 k/uL    Immature Granulocytes Absolute 0.03 k/uL   CMP   Result Value Ref Range    Sodium 140 136 - 145 mmol/L    Potassium 2.9 (LL) 3.5 - 5.1 mmol/L    Chloride 99 99 - 110 mmol/L    CO2 26 21 - 32 mmol/L    Anion Gap 16 9 - 17 mmol/L    Glucose 128 (H) 74 - 99 mg/dL    BUN 4 (L) 7 - 20 mg/dL    Creatinine 0.7 (L) 0.9 - 1.3 mg/dL    Est, Glom Filt Rate >90 >60 mL/min/1.73m2    Calcium 7.9 (L) 8.3 - 10.6 mg/dL    Total Protein 5.6 (L) 6.4 - 8.2 g/dL    Albumin 3.5 3.4 - 5.0 g/dL    Albumin/Globulin Ratio 1.7 1.1 - 2.2    Total Bilirubin 0.3 0.0 - 1.0 mg/dL    Alkaline Phosphatase 124 40 - 129 U/L     (H) 10 - 40 
2023    OPEN REDUCTION INTERNAL FIXATION RIGHT HAND 5TH METACARPAL FRACTURE performed by Jada Krishna MD at [facility] OR       CURRENTMEDICATIONS       Discharge Medication List as of 2025  5:07 AM        CONTINUE these medications which have NOT CHANGED    Details   ibuprofen (ADVIL;MOTRIN) 800 MG tablet Take 1 tablet by mouth every 8 hours as needed for Pain, Disp-30 tablet, R-0Print             ALLERGIES     Bee venom    FAMILYHISTORY       Family History   Problem Relation Age of Onset    Cancer Maternal Aunt          of skin cancer        SOCIAL HISTORY       Social History     Socioeconomic History    Marital status: Single     Spouse name: None    Number of children: None    Years of education: None    Highest education level: None   Tobacco Use    Smoking status: Every Day     Current packs/day: 1.00     Average packs/day: 1 pack/day for 20.0 years (20.0 ttl pk-yrs)     Types: Cigarettes    Smokeless tobacco: Never    Tobacco comments:     2-4 cigars daily   Substance and Sexual Activity    Alcohol use: Yes     Comment: social    Drug use: Yes     Types: Marijuana (Weed)     Comment: 5 x a week sometimes    Sexual activity: Yes     Social Determinants of Health     Food Insecurity: No Food Insecurity (2024)    Received from [facility]    Hunger Vital Sign     Worried About Running Out of Food in the Last Year: Never true     Ran Out of Food in the Last Year: Never true   Transportation Needs: No Transportation Needs (2024)    Received from [facility]    PRAPARE - Transportation     Lack of Transportation (Medical): No     Lack of Transportation (Non-Medical): No   Intimate Partner Violence: Not At Risk (2024)    Received from [facility]    Humiliation, Afraid, Rape, and Kick questionnaire     Fear of Current or Ex-Partner: No     Emotionally Abused: No     Physically Abused: No     Sexually Abused: No   Housing Stability: High Risk (2024)    Received from [facility]    
none required

## (undated) DEVICE — GOWN SIRUS NONREIN XL W/TWL: Brand: MEDLINE INDUSTRIES, INC.

## (undated) DEVICE — DRILL, WL 27MM, AO-SHAFT: Brand: PROFYLE

## (undated) DEVICE — MASTISOL ADHESIVE LIQ 2/3ML

## (undated) DEVICE — C-ARM PACK: Brand: C-ARM COVER

## (undated) DEVICE — ELECTRODE PT RET AD L9FT HI MOIST COND ADH HYDRGEL CORDED

## (undated) DEVICE — SPLINT ORTH W3XL12IN LAYERED FBRGLS FOAM PD BRTH BK MOLD

## (undated) DEVICE — DRESSING,GAUZE,XEROFORM,CURAD,1"X8",ST: Brand: CURAD

## (undated) DEVICE — SUTURE VCRL + SZ 3-0 L18IN ABSRB UD SH 1/2 CIR TAPERCUT NDL VCP864D

## (undated) DEVICE — GLOVE SURG SZ 8 CRM LTX FREE POLYISOPRENE POLYMER BEAD ANTI

## (undated) DEVICE — HAND AND ELBOW: Brand: MEDLINE INDUSTRIES, INC.

## (undated) DEVICE — PADDING UNDERCAST W4INXL4YD 100% COT CRIMPED FINISH WBRL II

## (undated) DEVICE — SUTURE MCRYL + SZ 4-0 L18IN ABSRB UD L19MM PS-2 3/8 CIR MCP496G

## (undated) DEVICE — GLOVE SURG SZ 65 THK91MIL LTX FREE SYN POLYISOPRENE

## (undated) DEVICE — GLOVE SURG SZ 65 L12IN FNGR THK79MIL GRN LTX FREE

## (undated) DEVICE — BASIC SINGLE BASIN 1-LF: Brand: MEDLINE INDUSTRIES, INC.

## (undated) DEVICE — NEPTUNE E-SEP SMOKE EVACUATION PENCIL, COATED, 70MM BLADE, PUSH BUTTON SWITCH: Brand: NEPTUNE E-SEP

## (undated) DEVICE — BANDAGE COMPR W4INXL15FT BGE E SGL LAYERED CLP CLSR

## (undated) DEVICE — SOLUTION IV IRRIG POUR BRL 0.9% SODIUM CHL 2F7124